# Patient Record
Sex: MALE | Race: WHITE | NOT HISPANIC OR LATINO | Employment: OTHER | URBAN - METROPOLITAN AREA
[De-identification: names, ages, dates, MRNs, and addresses within clinical notes are randomized per-mention and may not be internally consistent; named-entity substitution may affect disease eponyms.]

---

## 2019-02-11 NOTE — PATIENT INSTRUCTIONS
Preventive Health Recommendations:     See your health care provider every year to    Review health changes.     Discuss preventive care.      Review your medicines if your doctor has prescribed any.    Talk with your health care provider about whether you should have a test to screen for prostate cancer (PSA).    Every 3 years, have a diabetes test (fasting glucose). If you are at risk for diabetes, you should have this test more often.    Every 5 years, have a cholesterol test. Have this test more often if you are at risk for high cholesterol or heart disease.     Every 10 years, have a colonoscopy. Or, have a yearly FIT test (stool test). These exams will check for colon cancer.    Talk to with your health care provider about screening for Abdominal Aortic Aneurysm if you have a family history of AAA or have a history of smoking.  Shots:     Get a flu shot each year.     Get a tetanus shot every 10 years.     Talk to your doctor about your pneumonia vaccines. There are now two you should receive - Pneumovax (PPSV 23) and Prevnar (PCV 13).    Talk to your pharmacist about a shingles vaccine.     Talk to your doctor about the hepatitis B vaccine.  Nutrition:     Eat at least 5 servings of fruits and vegetables each day.     Eat whole-grain bread, whole-wheat pasta and brown rice instead of white grains and rice.     Get adequate Calcium and Vitamin D.   Lifestyle    Exercise for at least 150 minutes a week (30 minutes a day, 5 days a week). This will help you control your weight and prevent disease.     Limit alcohol to one drink per day.     No smoking.     Wear sunscreen to prevent skin cancer.     See your dentist every six months for an exam and cleaning.     See your eye doctor every 1 to 2 years to screen for conditions such as glaucoma, macular degeneration and cataracts.    Personalized Prevention Plan  You are due for the preventive services outlined below.  Your care team is available to assist you in  scheduling these services.  If you have already completed any of these items, please share that information with your care team to update in your medical record.    Health Maintenance Due   Topic Date Due     Talk to your care team about options to quit tobacco use.  1950     Zoster (Shingles) Vaccine (1 of 2) 11/25/2001     Discuss Advance Directive Planning  11/25/2006     FALL RISK ASSESSMENT  11/25/2016     AORTIC ANEURYSM SCREENING (SYSTEM ASSIGNED)  11/25/2016     Depression Assessment 2 - yearly  05/04/2017     Flu Vaccine (1) 09/01/2018     Preventive Health Recommendations:     See your health care provider every year to    Review health changes.     Discuss preventive care.      Review your medicines if your doctor has prescribed any.    Talk with your health care provider about whether you should have a test to screen for prostate cancer (PSA).    Every 3 years, have a diabetes test (fasting glucose). If you are at risk for diabetes, you should have this test more often.    Every 5 years, have a cholesterol test. Have this test more often if you are at risk for high cholesterol or heart disease.     Every 10 years, have a colonoscopy. Or, have a yearly FIT test (stool test). These exams will check for colon cancer.    Talk to with your health care provider about screening for Abdominal Aortic Aneurysm if you have a family history of AAA or have a history of smoking.  Shots:     Get a flu shot each year.     Get a tetanus shot every 10 years.     Talk to your doctor about your pneumonia vaccines. There are now two you should receive - Pneumovax (PPSV 23) and Prevnar (PCV 13).    Talk to your pharmacist about a shingles vaccine.     Talk to your doctor about the hepatitis B vaccine.  Nutrition:     Eat at least 5 servings of fruits and vegetables each day.     Eat whole-grain bread, whole-wheat pasta and brown rice instead of white grains and rice.     Get adequate Calcium and Vitamin D.    Lifestyle    Exercise for at least 150 minutes a week (30 minutes a day, 5 days a week). This will help you control your weight and prevent disease.     Limit alcohol to one drink per day.     No smoking.     Wear sunscreen to prevent skin cancer.     See your dentist every six months for an exam and cleaning.     See your eye doctor every 1 to 2 years to screen for conditions such as glaucoma, macular degeneration and cataracts.    Personalized Prevention Plan  You are due for the preventive services outlined below.  Your care team is available to assist you in scheduling these services.  If you have already completed any of these items, please share that information with your care team to update in your medical record.    Health Maintenance Due   Topic Date Due     Talk to your care team about options to quit tobacco use.  1950     Zoster (Shingles) Vaccine (1 of 2) 11/25/2001     Discuss Advance Directive Planning  11/25/2006     FALL RISK ASSESSMENT  11/25/2016     AORTIC ANEURYSM SCREENING (SYSTEM ASSIGNED)  11/25/2016     Depression Assessment 2 - yearly  05/04/2017     Flu Vaccine (1) 09/01/2018

## 2019-02-11 NOTE — PROGRESS NOTES
"SUBJECTIVE:   Uvaldo Ascencio is a 68 year old male who presents for Preventive Visit.    Are you in the first 12 months of your Medicare coverage?  No    Annual Wellness Visit     In general, how would you rate your overall health?  Good    Frequency of exercise:  2-3 days/week    Duration of exercise:  Greater than 60 minutes    Do you usually eat at least 4 servings of fruit and vegetables a day, include whole grains    & fiber and avoid regularly eating high fat or \"junk\" foods?  No    Taking medications regularly:  Yes    Medication side effects:  Not applicable    Ability to successfully perform activities of daily living:  No assistance needed    Home Safety:  No safety concerns identified    Hearing Impairment:  Difficulty following a conversation in a noisy restaurant or crowded room    In the past 6 months, have you been bothered by leaking of urine?  No    In general, how would you rate your overall mental or emotional health?  Excellent    PHQ-2 Total Score: 0    Additional concerns today:  Yes    Do you feel safe in your environment? Yes    Do you have a Health Care Directive? No: Advance care planning was reviewed with patient; patient declined at this time.    1) Concern about skin spot on the left side neck, onset for few years now, but patient states he noticed change of skin texture and color about a month now, redness.     Fall risk  Fallen 2 or more times in the past year?: No  Any fall with injury in the past year?: No  click delete button to remove this line now  Cognitive Screening   1) Repeat 3 items (Leader, Season, Table)    2) Clock draw: NORMAL  3) 3 item recall: Recalls 3 objects  Results: 3 items recalled: COGNITIVE IMPAIRMENT LESS LIKELY    Mini-CogTM Copyright JOY Velásquez. Licensed by the author for use in [facility]; reprinted with permission (jakub@.[facility]). All rights reserved.      Reviewed and updated as needed this visit by clinical staff  Tobacco  Allergies  Meds  " Med Hx  Surg Hx  Fam Hx  Soc Hx        Reviewed and updated as needed this visit by Provider        Social History     Tobacco Use     Smoking status: Former Smoker     Types: Cigars     Smokeless tobacco: Never Used   Substance Use Topics     Alcohol use: Yes     Comment: few a month       Alcohol Use 2/20/2019   If you drink alcohol do you typically have greater than 3 drinks per day OR greater than 7 drinks per week? Yes     Skin lesion  The patient states that he has a lesion on the left side of his shoulder. He states that the lesion itches and it changed color recently. He declines using any ointment for this lesion.     Heart palpitations   The patient states that his heart flutters at times. He states that the fluttering started a few years ago. He notes that if he shovels the driveway, and comes in to rest, he notices the fluttering. He declines shortness of breath, pain, lightheadedness or dizziness.     GERD  The patient states that from time to time, he won't take his Omeprazole. He states that overall he is doing well and mainly notices heartburn at night.     Current providers sharing in care for this patient include:   Patient Care Team:  Kathy Dickson MD as PCP - General (Family Practice)  Kathy Dickson MD as PCP - Assigned PCP    The following health maintenance items are reviewed in Epic and correct as of today:  Health Maintenance   Topic Date Due     TOBACCO CESSATION COUNSELING Q1 YR  1950     ZOSTER IMMUNIZATION (1 of 2) 11/25/2001     ADVANCE DIRECTIVE PLANNING Q5 YRS  11/25/2006     FALL RISK ASSESSMENT  11/25/2016     AORTIC ANEURYSM SCREENING (SYSTEM ASSIGNED)  11/25/2016     MEDICARE ANNUAL WELLNESS VISIT  05/04/2017     PHQ-2 Q1 YR  05/04/2017     INFLUENZA VACCINE (1) 09/01/2018     DTAP/TDAP/TD IMMUNIZATION (2 - Td) 04/05/2021     LIPID SCREEN Q5 YR MALE (SYSTEM ASSIGNED)  05/04/2021     COLON CANCER SCREEN (SYSTEM ASSIGNED)  07/15/2026     HEPATITIS C SCREENING   Completed     IPV IMMUNIZATION  Aged Out     MENINGITIS IMMUNIZATION  Aged Out     BP Readings from Last 3 Encounters:   02/20/19 138/80   07/15/16 114/86   05/04/16 140/80    Wt Readings from Last 3 Encounters:   02/20/19 73.9 kg (163 lb)   07/08/16 70.3 kg (155 lb)   05/04/16 75.1 kg (165 lb 8 oz)                  Patient Active Problem List   Diagnosis     GERD (gastroesophageal reflux disease)     CARDIOVASCULAR SCREENING; LDL GOAL LESS THAN 160     Knee pain     NSAID long-term use     Left inguinal hernia     Arthritis of knee     Tobacco use disorder     Past Surgical History:   Procedure Laterality Date     APPENDECTOMY       ARTHROPLASTY KNEE UNICOMPARTMENT  [facility]/27/12    [facility]     COLONOSCOPY WITH CO2 INSUFFLATION N/A 7/15/2016    Procedure: COLONOSCOPY WITH CO2 INSUFFLATION;  Surgeon: Kathy Dickson MD;  Location: [facility] OR     HERNIA REPAIR  2003    right sided       Social History     Tobacco Use     Smoking status: Former Smoker     Types: Cigars     Smokeless tobacco: Never Used   Substance Use Topics     Alcohol use: Yes     Comment: few a month     Family History   Problem Relation Age of Onset     Heart Disease Father      Breast Cancer Sister      Asthma No family hx of      C.A.D. No family hx of      Diabetes No family hx of      Hypertension No family hx of      Cerebrovascular Disease No family hx of      Cancer - colorectal No family hx of      Prostate Cancer No family hx of      Alcohol/Drug No family hx of      Allergies No family hx of      Alzheimer Disease No family hx of      Anesthesia Reaction No family hx of      Arthritis No family hx of      Circulatory No family hx of      Congenital Anomalies No family hx of      Blood Disease No family hx of      Cancer No family hx of      Cardiovascular No family hx of      Connective Tissue Disorder No family hx of          Current Outpatient Medications   Medication Sig Dispense Refill     Naproxen Sodium (ALEVE PO)         "Omeprazole Magnesium (PRILOSEC OTC PO)        Allergies   Allergen Reactions     No Known Drug Allergies      Recent Labs   Lab Test 05/04/16  1341 04/29/14  1442 04/07/11  1417   * 97 122   HDL 85 86 80   TRIG 79 89 94   ALT 21  --   --    CR 0.98  --  0.94   GFRESTIMATED 76  --  82   GFRESTBLACK >90   GFR Calc    --  >90   POTASSIUM 4.1  --  3.8      Review of Systems   Constitutional: Negative for chills and fever.   HENT: Negative for congestion, ear pain and sore throat.    Eyes: Negative for pain and visual disturbance.   Respiratory: Negative for cough and shortness of breath.    Cardiovascular: Positive for palpitations. Negative for chest pain and peripheral edema.   Gastrointestinal: Positive for heartburn. Negative for abdominal pain, constipation, diarrhea, hematochezia and nausea.   Genitourinary: Negative for discharge, dysuria, frequency, genital sores, hematuria, impotence and urgency.   Musculoskeletal: Positive for arthralgias and myalgias. Negative for joint swelling.   Skin: Positive for rash.   Neurological: Positive for dizziness and paresthesias. Negative for weakness and headaches.   Psychiatric/Behavioral: Negative for mood changes. The patient is nervous/anxious.      This document serves as a record of the services and decisions personally performed and made by Kathy Dickson MD. It was created on her behalf by Shahana Lin, a trained medical scribe. The creation of this document is based the provider's statements to the medical scribe.    Shahana Lin February 20, 2019 2:48 PM  OBJECTIVE:   /80   Pulse 84   Temp 98.1  F (36.7  C) (Oral)   Resp 16   Ht 1.685 m (5' 6.34\")   Wt 73.9 kg (163 lb)   BMI 26.04 kg/m   Estimated body mass index is 26.04 kg/m  as calculated from the following:    Height as of this encounter: 1.685 m (5' 6.34\").    Weight as of this encounter: 73.9 kg (163 lb).  Physical Exam  GENERAL: healthy, alert and no distress  EYES: " Eyes grossly normal to inspection, PERRL and conjunctivae and sclerae normal  HENT: ear canals and TM's normal, nose and mouth without ulcers or lesions  NECK: no adenopathy, no asymmetry, masses, or scars and thyroid normal to palpation  RESP: lungs clear to auscultation - no rales, rhonchi or wheezes  CV: regular rate and rhythm, normal S1 S2, no S3 or S4, no murmur, click or rub, no peripheral edema and peripheral pulses strong  ABDOMEN: soft, nontender, no hepatosplenomegaly, no masses and bowel sounds normal   (male): normal male genitalia without lesions or urethral discharge, no hernia  RECTAL: normal sphincter tone, no rectal masses, prostate normal size, smooth, nontender without nodules or masses  MS: no gross musculoskeletal defects noted, no edema  SKIN: no suspicious rashes, just above left clavicle is a quarter sized irregularly shaped lesion that has some superficial ulcerations centrally    NEURO: Normal strength and tone, mentation intact and speech normal  PSYCH: mentation appears normal, affect normal/bright    Diagnostic Test Results:  No results found for this or any previous visit (from the past 24 hour(s)).    ASSESSMENT / PLAN:   1. Routine general medical examination at a health care facility  Physical exam was administered today.   Patient is doing well.   See counseling messages below.     2. Neoplasm of uncertain behavior of skin  Patient reports having a lesion on the left side of his shoulder.  He reports that lesion itches and has changed color recently.   Exam showed just above left clavicle is a quarter sized irregularly shaped lesion that has some superficial ulcerations centrally.   Discussed plan with patient.  Patient agreed.  Dermatology referral has been given.   Patient will schedule appointment before leaving clinic today.   - DERMATOLOGY REFERRAL    3. Tobacco use disorder  Patient is a former smoker.   - TOBACCO CESSATION ORDER FOR         End of Life Planning:  Patient  "currently has an advanced directive: No.  I have verified the patient's ablity to prepare an advanced directive/make health care decisions.  Literature was provided to assist patient in preparing an advanced directive.    COUNSELING:  Special attention given to:       Regular exercise       Healthy diet/nutrition       Vision screening    BP Readings from Last 1 Encounters:   02/20/19 138/80     Estimated body mass index is 26.04 kg/m  as calculated from the following:    Height as of this encounter: 1.685 m (5' 6.34\").    Weight as of this encounter: 73.9 kg (163 lb).      Weight management plan: Discussed healthy diet and exercise guidelines     reports that he has quit smoking. His smoking use included cigars. he has never used smokeless tobacco.      Appropriate preventive services were discussed with this patient, including applicable screening as appropriate for cardiovascular disease, diabetes, osteopenia/osteoporosis, and glaucoma.  As appropriate for age/gender, discussed screening for colorectal cancer, prostate cancer, breast cancer, and cervical cancer. Checklist reviewing preventive services available has been given to the patient.    Reviewed patients plan of care and provided an AVS. The Basic Care Plan (routine screening as documented in Health Maintenance) for Uvaldo meets the Care Plan requirement. This Care Plan has been established and reviewed with the Patient.    Counseling Resources:  ATP IV Guidelines  Pooled Cohorts Equation Calculator  Breast Cancer Risk Calculator  FRAX Risk Assessment  ICSI Preventive Guidelines  Dietary Guidelines for Americans, 2010  USDA's MyPlate  ASA Prophylaxis  Lung CA Screening    The information in this document, created by the medical scribe for me, accurately reflects the services I personally performed and the decisions made by me. I have reviewed and approved this document for accuracy prior to leaving the patient care area.    DANE ROSARIO MD, " MD  [facility]

## 2019-02-20 ENCOUNTER — OFFICE VISIT (OUTPATIENT)
Dept: FAMILY MEDICINE | Facility: CLINIC | Age: 69
End: 2019-02-20
Payer: MEDICARE

## 2019-02-20 VITALS
HEART RATE: 84 BPM | BODY MASS INDEX: 26.2 KG/M2 | TEMPERATURE: 98.1 F | WEIGHT: 163 LBS | HEIGHT: 66 IN | RESPIRATION RATE: 16 BRPM | DIASTOLIC BLOOD PRESSURE: 80 MMHG | SYSTOLIC BLOOD PRESSURE: 138 MMHG

## 2019-02-20 DIAGNOSIS — F17.200 TOBACCO USE DISORDER: ICD-10-CM

## 2019-02-20 DIAGNOSIS — D48.5 NEOPLASM OF UNCERTAIN BEHAVIOR OF SKIN: ICD-10-CM

## 2019-02-20 DIAGNOSIS — Z00.00 ROUTINE GENERAL MEDICAL EXAMINATION AT A HEALTH CARE FACILITY: Primary | ICD-10-CM

## 2019-02-20 PROCEDURE — G0438 PPPS, INITIAL VISIT: HCPCS | Performed by: FAMILY MEDICINE

## 2019-02-20 ASSESSMENT — ENCOUNTER SYMPTOMS
PARESTHESIAS: 1
WEAKNESS: 0
PALPITATIONS: 1
FREQUENCY: 0
HEMATOCHEZIA: 0
FEVER: 0
CHILLS: 0
HEADACHES: 0
NAUSEA: 0
HEARTBURN: 1
DIARRHEA: 0
COUGH: 0
JOINT SWELLING: 0
SHORTNESS OF BREATH: 0
EYE PAIN: 0
HEMATURIA: 0
SORE THROAT: 0
ABDOMINAL PAIN: 0
DIZZINESS: 1
CONSTIPATION: 0
MYALGIAS: 1
ARTHRALGIAS: 1
DYSURIA: 0
NERVOUS/ANXIOUS: 1

## 2019-02-20 ASSESSMENT — MIFFLIN-ST. JEOR: SCORE: 1462.49

## 2019-02-20 ASSESSMENT — PAIN SCALES - GENERAL: PAINLEVEL: NO PAIN (0)

## 2019-02-20 ASSESSMENT — ACTIVITIES OF DAILY LIVING (ADL): CURRENT_FUNCTION: NO ASSISTANCE NEEDED

## 2019-02-21 ENCOUNTER — OFFICE VISIT (OUTPATIENT)
Dept: DERMATOLOGY | Facility: CLINIC | Age: 69
End: 2019-02-21
Attending: FAMILY MEDICINE
Payer: MEDICARE

## 2019-02-21 DIAGNOSIS — L82.1 SEBORRHEIC KERATOSIS: ICD-10-CM

## 2019-02-21 DIAGNOSIS — L57.8 SUN-DAMAGED SKIN: ICD-10-CM

## 2019-02-21 DIAGNOSIS — L81.4 SOLAR LENTIGO: ICD-10-CM

## 2019-02-21 DIAGNOSIS — D22.9 MULTIPLE BENIGN NEVI: ICD-10-CM

## 2019-02-21 DIAGNOSIS — L57.0 ACTINIC KERATOSES: ICD-10-CM

## 2019-02-21 DIAGNOSIS — D48.5 NEOPLASM OF UNCERTAIN BEHAVIOR OF SKIN: Primary | ICD-10-CM

## 2019-02-21 DIAGNOSIS — D18.01 CHERRY ANGIOMA: ICD-10-CM

## 2019-02-21 DIAGNOSIS — R23.8 VENOUS LAKE OF LIP: ICD-10-CM

## 2019-02-21 PROCEDURE — 11103 TANGNTL BX SKIN EA SEP/ADDL: CPT | Performed by: DERMATOLOGY

## 2019-02-21 PROCEDURE — 99203 OFFICE O/P NEW LOW 30 MIN: CPT | Mod: 25 | Performed by: DERMATOLOGY

## 2019-02-21 PROCEDURE — 88305 TISSUE EXAM BY PATHOLOGIST: CPT | Mod: TC | Performed by: DERMATOLOGY

## 2019-02-21 PROCEDURE — 11102 TANGNTL BX SKIN SINGLE LES: CPT | Performed by: DERMATOLOGY

## 2019-02-21 RX ORDER — FLUOROURACIL 50 MG/G
CREAM TOPICAL
Qty: 40 G | Refills: 1 | Status: SHIPPED | OUTPATIENT
Start: 2019-02-21 | End: 2020-02-18

## 2019-02-21 ASSESSMENT — PAIN SCALES - GENERAL: PAINLEVEL: NO PAIN (0)

## 2019-02-21 NOTE — PROGRESS NOTES
[facility] Dermatology Note      Dermatology Problem List:  1. NUB, A) left supraclavicular, s/p biopsy 2/21/19  2. NUB, B) left shin, s/p biopsy 2/21/2019  3. Actinic keratoses, diffuse  - current tx: efudex 5% cream BID for 3-4 weeks    Encounter Date: Feb 21, 2019    CC:  Chief Complaint   Patient presents with     Skin Check     left neck         History of Present Illness:  Mr. Uvaldo Ascencio is a 68 year old male who presents as a referral from Dr. Dickson for a lesion of concern on his left neck. It has been there about a year. He denies any bleeding or pain at the site. When asked about a lesion on his left shin, he states he had not noticed a spot there. No symptoms at this area either. No personal or family history of skin cancer. Patient had frequent sun exposure with his job for [employer] - he is now retired but still spends a lot of time outdoors. No other concerns addressed today.      Past Medical History:   Patient Active Problem List   Diagnosis     GERD (gastroesophageal reflux disease)     CARDIOVASCULAR SCREENING; LDL GOAL LESS THAN 160     Knee pain     NSAID long-term use     Left inguinal hernia     Arthritis of knee     Tobacco use disorder     No past medical history on file.  Past Surgical History:   Procedure Laterality Date     APPENDECTOMY       ARTHROPLASTY KNEE UNICOMPARTMENT  01/27/12    [facility]     COLONOSCOPY WITH CO2 INSUFFLATION N/A 7/15/2016    Procedure: COLONOSCOPY WITH CO2 INSUFFLATION;  Surgeon: Kahty Dickson MD;  Location: [facility] OR     HERNIA REPAIR  2003    right sided       Social History:  Patient reports that he has quit smoking. His smoking use included cigars. he has never used smokeless tobacco. He reports that he drinks alcohol. He reports that he does not use drugs. Patient is retired from [employer]. Patient is an avid connolly      Family History:  Family History   Problem Relation Age of Onset     Heart Disease Father      Breast Cancer  Sister      Asthma No family hx of      C.A.D. No family hx of      Diabetes No family hx of      Hypertension No family hx of      Cerebrovascular Disease No family hx of      Cancer - colorectal No family hx of      Prostate Cancer No family hx of      Alcohol/Drug No family hx of      Allergies No family hx of      Alzheimer Disease No family hx of      Anesthesia Reaction No family hx of      Arthritis No family hx of      Circulatory No family hx of      Congenital Anomalies No family hx of      Blood Disease No family hx of      Cancer No family hx of      Cardiovascular No family hx of      Connective Tissue Disorder No family hx of        Medications:  Current Outpatient Medications   Medication Sig Dispense Refill     Naproxen Sodium (ALEVE PO)        Omeprazole Magnesium (PRILOSEC OTC PO)          Allergies   Allergen Reactions     No Known Drug Allergies        Review of Systems:  -Constitutional: Patient is otherwise feeling well, in usual state of health.   -Skin: As above in HPI. No additional skin concerns.    Physical exam:  Vitals: There were no vitals taken for this visit.  GEN: This is a well developed, well-nourished male in no acute distress, in a pleasant mood.    SKIN: Total skin excluding the undergarment areas was performed. The exam included the head/face, neck, both arms, chest, back, abdomen, both legs, digits and/or nails and buttocks.   - Carlin Type I  - Numerous brown macules on sun exposed areas.  - White macules scattered on mid to lower back   - Patient tan on exam.  - venous lake on the left lower lip  - Left supraclavicular, 2 x 1.5 cm pink shiny plaque with erosion overlying it, blue grey ovoid nest appearnce, fine blood vessels at the periphery.  - Diffuse actinic keratoses on the vertex frontal, scalp, forehead, nose, and preauricular cheeks  -There are dome shaped bright red papules on the trunk.   - Multiple regular brown pigmented macules and papules are identified on  the trunk and extremtiies.   - There are waxy stuck on tan to brown papules on the trunk.  - Left anterior shin, 9 mm pink macule with blue grey ovoid nest appearance to it  - No other lesions of concern on areas examined.                 Impression/Plan:  1. Sun damaged skin with solar lentigines    Recommend sunscreens SPF #30 or greater, protective clothing and avoidance of tanning beds.    2. Benign findings including: seborrheic keratoses, cherry angioma, venous lake    No further intervention required. Patient to report changes.     Patient reassured of the benign nature of these lesions.    3. Multiple clinically benign nevi    No further intervention required. Patient to report changes.     Patient reassured of the benign nature of these lesions.    4. Actinic keratosis. Discussed precancerous nature of these lesions. Recommended treatment to prevent progression to a squamous cell carcinoma. Due to the diffuse nature of the actinic keratoses, recommend field treatment with efudex 5% cream. Briefly discussed PDT as a treatment. Patient is agreeable to treat with efudex cream as this likely has higher clearance rate.     Prescribed efudex cream to be used on the scalp, forehead, lateral cheeks, and nose for 3-4 weeks twice a day. Discussed side effects of the medication with the patient, handout provided.     5. NUB, A) left supraclavicular. Ddx: BCC vs SCC    Shave biopsy:  After discussion of benefits and risks including but not limited to bleeding/bruising, pain/swelling, infection, scar, incomplete removal, nerve damage/numbness, recurrence, and non-diagnostic biopsy, written consent, verbal consent and photographs were obtained. Time-out was performed. The area was cleaned with isopropyl alcohol. 0.5ml of 1% lidocaine with 1:100,000 epinephrine was injected to obtain adequate anesthesia. A shave biopsy was performed. Hemostasis was achieved with aluminium chloride. Vaseline and a sterile dressing were  applied. The patient tolerated the procedure and no complications were noted. The patient was provided with verbal and written post care instructions.    6. NUB, B) left shin. Ddx: BCC vs scar    Shave biopsy:  After discussion of benefits and risks including but not limited to bleeding/bruising, pain/swelling, infection, scar, incomplete removal, nerve damage/numbness, recurrence, and non-diagnostic biopsy, written consent, verbal consent and photographs were obtained. Time-out was performed. The area was cleaned with isopropyl alcohol. 0.5ml of 1% lidocaine with 1:100,000 epinephrine was injected to obtain adequate anesthesia. A shave biopsy was performed. Hemostasis was achieved with aluminium chloride. Vaseline and a sterile dressing were applied. The patient tolerated the procedure and no complications were noted. The patient was provided with verbal and written post care instructions.    CC Kathy Dickson MD on close of this encounter.  Follow-up in 2 months to recheck AKs after efudex treatment, earlier for new or changing lesions.       Staff Involved:  Scribe/Staff    Scribe Disclosure  I, Rita Powell, am serving as a scribe to document services personally performed by Dr. Tess Chang MD, based on data collection and the provider's statements to me.     Provider Disclosure:   The documentation recorded by the scribe accurately reflects the services I personally performed and the decisions made by me.    Tess Chang MD    Department of Dermatology  [facility]: Phone: [phone], Fax:[phone]  [facility]: Phone: [phone], Fax: [phone]

## 2019-02-21 NOTE — NURSING NOTE
Uvaldo Ascencio's goals for this visit include:   Chief Complaint   Patient presents with     Skin Check     left neck       He requests these members of his care team be copied on today's visit information: NO    PCP: Kathy Dickson    Referring Provider:  Kathy Dickson MD  [address] [place] [address]    There were no vitals taken for this visit.    Do you need any medication refills at today's visit? NO    Tomasa Larry [facility]

## 2019-02-21 NOTE — LETTER
2/21/2019         RE: Uvaldo Ascencio  [address]  [place] [address]        Dear Colleague,    Thank you for referring your patient, Uvaldo Ascencio, to the [facility]. Please see a copy of my visit note below.    [facility] Dermatology Note      Dermatology Problem List:  1. NUB, A) left supraclavicular, s/p biopsy 2/21/19  2. NUB, B) left shin, s/p biopsy 2/21/2019  3. Actinic keratoses, diffuse  - current tx: efudex 5% cream BID for 3-4 weeks    Encounter Date: Feb 21, 2019    CC:  Chief Complaint   Patient presents with     Skin Check     left neck         History of Present Illness:  Mr. Uvaldo Ascencio is a 68 year old male who presents as a referral from Dr. Dickson for a lesion of concern on his left neck. It has been there about a year. He denies any bleeding or pain at the site. When asked about a lesion on his left shin, he states he had not noticed a spot there. No symptoms at this area either. No personal or family history of skin cancer. Patient had frequent sun exposure with his job for [employer] - he is now retired but still spends a lot of time outdoors. No other concerns addressed today.      Past Medical History:   Patient Active Problem List   Diagnosis     GERD (gastroesophageal reflux disease)     CARDIOVASCULAR SCREENING; LDL GOAL LESS THAN 160     Knee pain     NSAID long-term use     Left inguinal hernia     Arthritis of knee     Tobacco use disorder     No past medical history on file.  Past Surgical History:   Procedure Laterality Date     APPENDECTOMY       ARTHROPLASTY KNEE UNICOMPARTMENT  01/27/12    [facility]     COLONOSCOPY WITH CO2 INSUFFLATION N/A 7/15/2016    Procedure: COLONOSCOPY WITH CO2 INSUFFLATION;  Surgeon: Kathy Dickson MD;  Location: [facility] OR     HERNIA REPAIR  2003    right sided       Social History:  Patient reports that he has quit smoking. His smoking use included cigars. he has never used smokeless tobacco. He  reports that he drinks alcohol. He reports that he does not use drugs. Patient is retired from [employer]. Patient is an avid connolly      Family History:  Family History   Problem Relation Age of Onset     Heart Disease Father      Breast Cancer Sister      Asthma No family hx of      C.A.D. No family hx of      Diabetes No family hx of      Hypertension No family hx of      Cerebrovascular Disease No family hx of      Cancer - colorectal No family hx of      Prostate Cancer No family hx of      Alcohol/Drug No family hx of      Allergies No family hx of      Alzheimer Disease No family hx of      Anesthesia Reaction No family hx of      Arthritis No family hx of      Circulatory No family hx of      Congenital Anomalies No family hx of      Blood Disease No family hx of      Cancer No family hx of      Cardiovascular No family hx of      Connective Tissue Disorder No family hx of        Medications:  Current Outpatient Medications   Medication Sig Dispense Refill     Naproxen Sodium (ALEVE PO)        Omeprazole Magnesium (PRILOSEC OTC PO)          Allergies   Allergen Reactions     No Known Drug Allergies        Review of Systems:  -Constitutional: Patient is otherwise feeling well, in usual state of health.   -Skin: As above in HPI. No additional skin concerns.    Physical exam:  Vitals: There were no vitals taken for this visit.  GEN: This is a well developed, well-nourished male in no acute distress, in a pleasant mood.    SKIN: Total skin excluding the undergarment areas was performed. The exam included the head/face, neck, both arms, chest, back, abdomen, both legs, digits and/or nails and buttocks.   - Carlin Type I  - Numerous brown macules on sun exposed areas.  - White macules scattered on mid to lower back   - Patient tan on exam.  - venous lake on the left lower lip  - Left supraclavicular, 2 x 1.5 cm pink shiny plaque with erosion overlying it, blue grey ovoid nest appearnce, fine blood vessels at the  periphery.  - Diffuse actinic keratoses on the vertex frontal, scalp, forehead, nose, and preauricular cheeks  -There are dome shaped bright red papules on the trunk.   - Multiple regular brown pigmented macules and papules are identified on the trunk and extremtiies.   - There are waxy stuck on tan to brown papules on the trunk.  - Left anterior shin, 9 mm pink macule with blue grey ovoid nest appearance to it  - No other lesions of concern on areas examined.                 Impression/Plan:  1. Sun damaged skin with solar lentigines    Recommend sunscreens SPF #30 or greater, protective clothing and avoidance of tanning beds.    2. Benign findings including: seborrheic keratoses, cherry angioma, venous lake    No further intervention required. Patient to report changes.     Patient reassured of the benign nature of these lesions.    3. Multiple clinically benign nevi    No further intervention required. Patient to report changes.     Patient reassured of the benign nature of these lesions.    4. Actinic keratosis. Discussed precancerous nature of these lesions. Recommended treatment to prevent progression to a squamous cell carcinoma. Due to the diffuse nature of the actinic keratoses, recommend field treatment with efudex 5% cream. Briefly discussed PDT as a treatment. Patient is agreeable to treat with efudex cream as this likely has higher clearance rate.     Prescribed efudex cream to be used on the scalp, forehead, lateral cheeks, and nose for 3-4 weeks twice a day. Discussed side effects of the medication with the patient, handout provided.     5. NUB, A) left supraclavicular. Ddx: BCC vs SCC    Shave biopsy:  After discussion of benefits and risks including but not limited to bleeding/bruising, pain/swelling, infection, scar, incomplete removal, nerve damage/numbness, recurrence, and non-diagnostic biopsy, written consent, verbal consent and photographs were obtained. Time-out was performed. The area was  cleaned with isopropyl alcohol. 0.5ml of 1% lidocaine with 1:100,000 epinephrine was injected to obtain adequate anesthesia. A shave biopsy was performed. Hemostasis was achieved with aluminium chloride. Vaseline and a sterile dressing were applied. The patient tolerated the procedure and no complications were noted. The patient was provided with verbal and written post care instructions.    6. NUB, B) left shin. Ddx: BCC vs scar    Shave biopsy:  After discussion of benefits and risks including but not limited to bleeding/bruising, pain/swelling, infection, scar, incomplete removal, nerve damage/numbness, recurrence, and non-diagnostic biopsy, written consent, verbal consent and photographs were obtained. Time-out was performed. The area was cleaned with isopropyl alcohol. 0.5ml of 1% lidocaine with 1:100,000 epinephrine was injected to obtain adequate anesthesia. A shave biopsy was performed. Hemostasis was achieved with aluminium chloride. Vaseline and a sterile dressing were applied. The patient tolerated the procedure and no complications were noted. The patient was provided with verbal and written post care instructions.    CC Kathy Dickson MD on close of this encounter.  Follow-up in 2 months to recheck AKs after efudex treatment, earlier for new or changing lesions.       Staff Involved:  Scribe/Staff    Scribe Disclosure  I, Rita Powell, am serving as a scribe to document services personally performed by Dr. Tess Chang MD, based on data collection and the provider's statements to me.     Provider Disclosure:   The documentation recorded by the scribe accurately reflects the services I personally performed and the decisions made by me.    Tess Chang MD    Department of Dermatology  [facility]: Phone: [phone], Fax:[phone]  [facility]: Phone: [phone],  Fax: [phone]              Again, thank you for allowing me to participate in the care of your patient.        Sincerely,        Tess Chang MD

## 2019-02-21 NOTE — PATIENT INSTRUCTIONS
Efudex Treatment    Today, you are being prescribed Fluorouracil (Efudex) a topical cream used for the treatment of Actinic Keratosis (AK's).  The medication is working to eliminate the unhealthy cells. Even though this treatment may be unattractive and somewhat uncomfortable.    Your treatment will last twice daily for 3-4 weeks, apply to the scalp, forehead, lateral cheeks, and nose.  You may experience some mild discomfort while being treated.    You will want to stop any other creams such as glycolic acid products, retin A, Tazorac, etc. to the area. You may use bland makeup/cover-up as long as it doesn't sting or cause you discomfort.    Apply the cream at night as your physician recommends. Use a cotton-tipped applicator, or use gloves if applying it with your fingertips. If applied with unprotected fingertips, it is important to wash your hands well after you apply this medicine.     Keep this medication away from pets.    We recommend avoiding excessive sun exposure to the treated area    You may use moisturizing creams over bothersome areas such as Vanicream or Cetaphil cream if the reaction becomes too bothersome. Please, call the clinic if this occurs.   Potential Side Effects    Your treated areas may be unsightly during therapy.  This will improve slowly following the discontinuation of therapy.     During the first week of application, mild inflammation may occur.     During the following weeks, redness, and swelling may occur with some crusting and burning.     Lesions resolve as the skin exfoliates.     Over 1 to 2 weeks, new skin grows into the treatment area.    Keep this medication away from pets  Specific side effects that usually do not require medical attention (report to your doctor or health care professional if they continue or are bothersome) include: Red or dark-colored skin     Mild erosion (loss of upper layer of skin)     Mild eye irritation including burning, itching, sensitivity,  stinging, or watering     Increased sensitivity of the skin to sun and ultraviolet light     Pain and burning of the affected area     Dryness, scaling or swelling of the affected area     Skin rash, itching of the affected area     Tenderness     If you have severe pain, please, call the clinic immediately and indicate that you have pain. Ask for a call from the RN.   Who should I call with questions?     [facility]: [phone]     [facility]: [phone]     For urgent needs outside of business hours call the [facility] at [phone]  and ask for the dermatology resident on call            Wound Care After a Biopsy    What is a skin biopsy?  A skin biopsy allows the doctor to examine a very small piece of tissue under the microscope to determine the diagnosis and the best treatment for the skin condition. A local anesthetic (numbing medicine)  is injected with a very small needle into the skin area to be tested. A small piece of skin is taken from the area. Sometimes a suture (stitch) is used.     What are the risks of a skin biopsy?  I will experience scar, bleeding, swelling, pain, crusting and redness. I may experience incomplete removal or recurrence. Risks of this procedure are excessive bleeding, bruising, infection, nerve damage, numbness, thick (hypertrophic or keloidal) scar and non-diagnostic biopsy.    How should I care for my wound for the first 24 hours?    Keep the wound dry and covered for 24 hours    If it bleeds, hold direct pressure on the area for 15 minutes. If bleeding does not stop then go to the emergency room    Avoid strenuous exercise the first 1-2 days or as your doctor instructs you    How should I care for the wound after 24 hours?    After 24 hours, remove the bandage    You may bathe or shower as normal    If you had a scalp biopsy, you can shampoo as usual and can use shower water to clean the biopsy site  daily    Clean the wound twice a day with gentle soap and water    Do not scrub, be gentle    Apply white petroleum/Vaseline after cleaning the wound with a cotton swab or a clean finger, and keep the site covered with a Bandaid /bandage. Bandages are not necessary with a scalp biopsy    If you are unable to cover the site with a Bandaid /bandage, re-apply ointment 2-3 times a day to keep the site moist. Moisture will help with healing    Avoid strenuous activity for first 1-2 days    Avoid lakes, rivers, pools, and oceans until the stitches are removed or the site is healed    How do I clean my wound?    Wash hands thoroughly with soap or use hand  before all wound care    Clean the wound with gentle soap and water    Apply white petroleum/Vaseline  to wound after it is clean    Replace the Bandaid /bandage to keep the wound covered for the first few days or as instructed by your doctor    If you had a scalp biopsy, warm shower water to the area on a daily basis should suffice    What should I use to clean my wound?     Cotton-tipped applicators (Qtips )    White petroleum jelly (Vaseline ). Use a clean new container and use Q-tips to apply.    Bandaids   as needed    Gentle soap     How should I care for my wound long term?    Do not get your wound dirty    Keep up with wound care for one week or until the area is healed.    A small scab will form and fall off by itself when the area is completely healed. The area will be red and will become pink in color as it heals. Sun protection is very important for how your scar will turn out. Sunscreen with an SPF 30 or greater is recommended once the area is healed.    You should have some soreness but it should be mild and slowly go away over several days. Talk to your doctor about using tylenol for pain,    When should I call my doctor?  If you have increased:     Pain or swelling    Pus or drainage (clear or slightly yellow drainage is ok)    Temperature over  100F    Spreading redness or warmth around wound    When will I hear about my results?  The biopsy results can take 2-3 weeks to come back. The clinic will call you with the results, send you a mycScrybet message, or have you schedule a follow-up clinic or phone time to discuss the results. Contact our clinics if you do not hear from us in 3 weeks.     Who should I call with questions?    [facility]: [phone]     [facility]: [phone]    For urgent needs outside of business hours call the [facility] at [phone] and ask for the dermatology resident on call

## 2019-02-25 LAB — COPATH REPORT: NORMAL

## 2019-02-26 ENCOUNTER — TELEPHONE (OUTPATIENT)
Dept: DERMATOLOGY | Facility: CLINIC | Age: 69
End: 2019-02-26

## 2019-02-26 NOTE — TELEPHONE ENCOUNTER
Notes recorded by Tess Chang MD on 2/26/2019 at 12:07 PM CST  Ok to use efudex instead of imiquimod as patient already has this at home.     Tess Chang MD    Department of Dermatology  [facility]: Phone: [phone], Fax:[phone]  [facility]: Phone: [phone], Fax: [phone]      ------    Notes recorded by Pilar Suarez LPN on 2/26/2019 at 12:01 PM CST  Pt called back at this time and made aware of results. Pt verbalized understanding. Pt currently using effudex on scalp and was wondering if he could use it on leg also. LPN asked Dr. Chang if this would be okay, she states yes that would be okay to use on the leg as well. Made pt aware and he had no concerns. Pt scheduled for effudex follow up and then 6 month skin check.     Pilar Suarez LPN    ------    Notes recorded by Nataliia Dockery, RN on 2/26/2019 at 9:58 AM CST  I left a message for patient to call [facility].  Nataliia Dockery RN    ------    Notes recorded by Tess Chang MD on 2/26/2019 at 8:17 AM CST  Please call patient and let him know results:    A. Harmless skin growth called BLK. Nothing to do.  B. Superficial BCC - I recommend imiquimod 5% cream applied daily to the area for 6 weeks. Please discuss treatment expectations (redness, sick irritation, rare but possible flu like symptoms, small chance of recurrence of skin cancer after treatment so we will need to monitor site after treatment for changes at future skin checks) with patient. I would like to see him back in 3 months to ensure the skin cancer is gone after treatment. If patient agreeable to plan, please let me or RN know so prescription can be sent.    Tess Chang MD    Department of Dermatology  [facility]:  Phone: [phone], Fax:[phone]  [facility]: Phone: [phone], Fax: [phone]      Patient Name: ESTEFANI BELLAMY   MR#: [id number]   Specimen #: [id number]   Collected: 2/21/2019   Received: 2/22/2019   Reported: 2/25/2019 23:14   Ordering Phy(s): LESLIE SAEED     For improved result formatting, select 'View Enhanced Report Format' under    Linked Documents section.     SPECIMEN(S):   A: Shave, left supraclavicular   B: Shave, left shin     FINAL DIAGNOSIS:   A. Shave, left supraclavicular:   - Benign lichenoid keratosis - see description     B. Shave, left shin:   - Basal cell carcinoma, superficial type - (see comment and description)

## 2019-04-24 ENCOUNTER — OFFICE VISIT (OUTPATIENT)
Dept: DERMATOLOGY | Facility: CLINIC | Age: 69
End: 2019-04-24
Payer: MEDICARE

## 2019-04-24 DIAGNOSIS — C44.91 SUPERFICIAL BASAL CELL CARCINOMA: Primary | ICD-10-CM

## 2019-04-24 PROCEDURE — 99212 OFFICE O/P EST SF 10 MIN: CPT | Performed by: DERMATOLOGY

## 2019-04-24 ASSESSMENT — PAIN SCALES - GENERAL: PAINLEVEL: NO PAIN (0)

## 2019-04-24 NOTE — NURSING NOTE
Uvaldo Ascencio's goals for this visit include:   Chief Complaint   Patient presents with     Lesion     left leg s/p efudex       He requests these members of his care team be copied on today's visit information: NO    PCP: Kathy Dickson    Referring Provider:  No referring provider defined for this encounter.    There were no vitals taken for this visit.    Do you need any medication refills at today's visit? NO    Tomasa Larry CMA

## 2019-04-24 NOTE — PROGRESS NOTES
[facility] Dermatology Note      Dermatology Problem List:  1. BLK, left supraclavicular chest, s/p biopsy 2/21/19  2. Superficial BCC, left shin, s/p biopsy 2/21/2019, efudex cream x6 weeks. *Recheck at next visit  3. Actinic keratoses, diffuse  - current tx: efudex 5% cream BID for 3-4 weeks    Encounter Date: Apr 24, 2019    CC:  Chief Complaint   Patient presents with     Lesion     left leg s/p efudex         History of Present Illness:  Mr. Uvaldo Ascencio is a 68 year old male who presents as a follow up for treatment with efudex cream. He has been treating his superficial BCC with efudex 5% cream for 2 weeks. He notes that the area did not get very red with treatment. It was never tender. He is unsure if it changed at all with the cream.     No other concerns addressed today.      Past Medical History:   Patient Active Problem List   Diagnosis     GERD (gastroesophageal reflux disease)     CARDIOVASCULAR SCREENING; LDL GOAL LESS THAN 160     Knee pain     NSAID long-term use     Left inguinal hernia     Arthritis of knee     Tobacco use disorder     No past medical history on file.  Past Surgical History:   Procedure Laterality Date     APPENDECTOMY       ARTHROPLASTY KNEE UNICOMPARTMENT  01/27/12    [facility]     COLONOSCOPY WITH CO2 INSUFFLATION N/A 7/15/2016    Procedure: COLONOSCOPY WITH CO2 INSUFFLATION;  Surgeon: Kathy Dickson MD;  Location: [facility] OR     HERNIA REPAIR  2003    right sided       Social History:  Patient reports that he has quit smoking. His smoking use included cigars. He has never used smokeless tobacco. He reports that he drinks alcohol. He reports that he does not use drugs. Patient is retired from [employer]. Patient is an avid connolly      Family History:  No family history of skin cancer.    Medications:  Current Outpatient Medications   Medication Sig Dispense Refill     fluorouracil (EFUDEX) 5 % external cream Apply twice daily as directed for 3-4 weeks  as tolerated. 40 g 1     Naproxen Sodium (ALEVE PO)        Omeprazole Magnesium (PRILOSEC OTC PO)          Allergies   Allergen Reactions     No Known Drug Allergies        Review of Systems:  -Constitutional: Patient is otherwise feeling well, in usual state of health.   -Skin: As above in HPI. No additional skin concerns.    Physical exam:  Vitals: There were no vitals taken for this visit.  GEN: This is a well developed, well-nourished male in no acute distress, in a pleasant mood.    SKIN: Focused exam of the face and left lower leg:  - Carlin Type I  - left medial anterior leg: bright pink macule, 2 mm in size adjacent to this area of hyperkeratotic scale where biopsy was completed. no arborizing or sharply in focus vessels or dermoscopy  - No other lesions of concern on areas examined.         Impression/Plan:    1. Superficial BCC, s/p efudex cream x 2 weeks.     Recommended patient continue the efudex cream another 4 weeks BID for a total of 6 weeks. Will recheck at next skin check to ensure that bright pink color has resolved.       Follow-up 5 months for skin exam, sooner for lesions of concern.       Staff Involved:  Scribe/Staff    Scribe Disclosure  I, Rita Powell, am serving as a scribe to document services personally performed by Dr. Tess Chang MD, based on data collection and the provider's statements to me.     Provider Disclosure:   The documentation recorded by the scribe accurately reflects the services I personally performed and the decisions made by me.    Tess Chang MD    Department of Dermatology  [facility]: Phone: [phone], Fax:[phone]  [facility]: Phone: [phone], Fax: [phone]

## 2019-04-24 NOTE — LETTER
4/24/2019         RE: Uvaldo Ascencio  [address]  [place] [address]        Dear Colleague,    Thank you for referring your patient, Uvaldo Ascencio, to the [facility]. Please see a copy of my visit note below.    [facility] Dermatology Note      Dermatology Problem List:  1. BLK, left supraclavicular chest, s/p biopsy 2/21/19  2. Superficial BCC, left shin, s/p biopsy 2/21/2019, efudex cream x6 weeks. *Recheck at next visit  3. Actinic keratoses, diffuse  - current tx: efudex 5% cream BID for 3-4 weeks    Encounter Date: Apr 24, 2019    CC:  Chief Complaint   Patient presents with     Lesion     left leg s/p efudex         History of Present Illness:  Mr. Uvaldo Ascencio is a 68 year old male who presents as a follow up for treatment with efudex cream. He has been treating his superficial BCC with efudex 5% cream for 2 weeks. He notes that the area did not get very red with treatment. It was never tender. He is unsure if it changed at all with the cream.     No other concerns addressed today.      Past Medical History:   Patient Active Problem List   Diagnosis     GERD (gastroesophageal reflux disease)     CARDIOVASCULAR SCREENING; LDL GOAL LESS THAN 160     Knee pain     NSAID long-term use     Left inguinal hernia     Arthritis of knee     Tobacco use disorder     No past medical history on file.  Past Surgical History:   Procedure Laterality Date     APPENDECTOMY       ARTHROPLASTY KNEE UNICOMPARTMENT  01/27/12    [facility]     COLONOSCOPY WITH CO2 INSUFFLATION N/A 7/15/2016    Procedure: COLONOSCOPY WITH CO2 INSUFFLATION;  Surgeon: Kathy Dickson MD;  Location: [facility] OR     HERNIA REPAIR  2003    right sided       Social History:  Patient reports that he has quit smoking. His smoking use included cigars. He has never used smokeless tobacco. He reports that he drinks alcohol. He reports that he does not use drugs. Patient is retired from [employer]. Patient is  an [employer]      Family History:  No family history of skin cancer.    Medications:  Current Outpatient Medications   Medication Sig Dispense Refill     fluorouracil (EFUDEX) 5 % external cream Apply twice daily as directed for 3-4 weeks as tolerated. 40 g 1     Naproxen Sodium (ALEVE PO)        Omeprazole Magnesium (PRILOSEC OTC PO)          Allergies   Allergen Reactions     No Known Drug Allergies        Review of Systems:  -Constitutional: Patient is otherwise feeling well, in usual state of health.   -Skin: As above in HPI. No additional skin concerns.    Physical exam:  Vitals: There were no vitals taken for this visit.  GEN: This is a well developed, well-nourished male in no acute distress, in a pleasant mood.    SKIN: Focused exam of the face and left lower leg:  - Carlin Type I  - left medial anterior leg: bright pink macule, 2 mm in size adjacent to this area of hyperkeratotic scale where biopsy was completed. no arborizing or sharply in focus vessels or dermoscopy  - No other lesions of concern on areas examined.         Impression/Plan:    1. Superficial BCC, s/p efudex cream x 2 weeks.     Recommended patient continue the efudex cream another 4 weeks BID for a total of 6 weeks. Will recheck at next skin check to ensure that bright pink color has resolved.       Follow-up 5 months for skin exam, sooner for lesions of concern.       Staff Involved:  Scribe/Staff    Scribe Disclosure  I, Rita Powell, am serving as a scribe to document services personally performed by Dr. Tess Chang MD, based on data collection and the provider's statements to me.     Provider Disclosure:   The documentation recorded by the scribe accurately reflects the services I personally performed and the decisions made by me.    Tess Chang MD    Department of Dermatology  [facility]: Phone: [phone], Fax:[phone]  [place]  [facility]: Phone: [phone], Fax: [phone]                Again, thank you for allowing me to participate in the care of your patient.        Sincerely,        Tess Chang MD

## 2019-08-27 ENCOUNTER — OFFICE VISIT (OUTPATIENT)
Dept: DERMATOLOGY | Facility: CLINIC | Age: 69
End: 2019-08-27
Payer: MEDICARE

## 2019-08-27 DIAGNOSIS — L81.4 SOLAR LENTIGO: ICD-10-CM

## 2019-08-27 DIAGNOSIS — Z85.828 HISTORY OF NONMELANOMA SKIN CANCER: Primary | ICD-10-CM

## 2019-08-27 DIAGNOSIS — L82.1 SEBORRHEIC KERATOSIS: ICD-10-CM

## 2019-08-27 DIAGNOSIS — L57.8 SUN-DAMAGED SKIN: ICD-10-CM

## 2019-08-27 DIAGNOSIS — D18.01 CHERRY ANGIOMA: ICD-10-CM

## 2019-08-27 DIAGNOSIS — D22.9 MULTIPLE BENIGN NEVI: ICD-10-CM

## 2019-08-27 DIAGNOSIS — L57.0 ACTINIC KERATOSIS: ICD-10-CM

## 2019-08-27 PROCEDURE — 17003 DESTRUCT PREMALG LES 2-14: CPT | Performed by: DERMATOLOGY

## 2019-08-27 PROCEDURE — 17000 DESTRUCT PREMALG LESION: CPT | Performed by: DERMATOLOGY

## 2019-08-27 PROCEDURE — 99214 OFFICE O/P EST MOD 30 MIN: CPT | Mod: 25 | Performed by: DERMATOLOGY

## 2019-08-27 ASSESSMENT — PAIN SCALES - GENERAL: PAINLEVEL: NO PAIN (0)

## 2019-08-27 NOTE — PATIENT INSTRUCTIONS
Cryotherapy    What is it?    Use of a very cold liquid, such as liquid nitrogen, to freeze and destroy abnormal skin cells that need to be removed    What should I expect?    Tenderness and redness    A small blister that might grow and fill with dark purple blood. There may be crusting.    More than one treatment may be needed if the lesions do not go away.    How do I care for the treated area?    Gently wash the area with your hands when bathing.    Use a thin layer of Vaseline to help with healing. You may use a Band-Aid.     The area should heal within 7-10 days and may leave behind a pink or lighter color.     Do not use an antibiotic or Neosporin ointment.     You may take acetaminophen (Tylenol) for pain.     Call your Doctor if you have:    Severe pain    Signs of infection (warmth, redness, cloudy yellow drainage, and or a bad smell)    Questions or concerns    Who should I call with questions?       [facility]: [phone]       [facility]: [phone]       For urgent needs outside of business hours call the [facility] at [phone]        and ask for the dermatology resident on call

## 2019-08-27 NOTE — LETTER
8/27/2019         RE: Uvaldo Ascencio  [address]  [place] [address]        Dear Colleague,    Thank you for referring your patient, Uvaldo Ascencio, to the [facility]. Please see a copy of my visit note below.    [facility] Dermatology Note      Dermatology Problem List:  1. BLK, left supraclavicular chest, s/p biopsy 2/21/19  2. Superficial BCC, left shin.   - s/p biopsy 2/21/2019, efudex cream x6 weeks.   3. Actinic keratoses, diffuse  - s/p efudex 5% cream BID for 3-4 weeks to scalp/face  - s/p cryotherapy    Encounter Date: Aug 27, 2019    CC:  Chief Complaint   Patient presents with     Skin Check     Hx of NMSC - no concerns         History of Present Illness:  Mr. Uvaldo Ascencio is a 68 year old male with a history of NMSC who presents as a follow up for treatment with efudex cream. He notes that the skin lesion treated has resolved entirely. He is not sure where it was it has healed so well. He has no new lesions of concern upon presentation today. Denies any tender, nonhealing, bleeding skin lesions. He spends a lot of time outside with his grandchildren and gardening. He has been wearing gloves and moisturizing regularly. He tries to wear hat when outside. No other concerns addressed today.      Past Medical History:   Patient Active Problem List   Diagnosis     GERD (gastroesophageal reflux disease)     CARDIOVASCULAR SCREENING; LDL GOAL LESS THAN 160     Knee pain     NSAID long-term use     Left inguinal hernia     Arthritis of knee     Tobacco use disorder     No past medical history on file.  Past Surgical History:   Procedure Laterality Date     APPENDECTOMY       ARTHROPLASTY KNEE UNICOMPARTMENT  01/27/12    [facility]     COLONOSCOPY WITH CO2 INSUFFLATION N/A 7/15/2016    Procedure: COLONOSCOPY WITH CO2 INSUFFLATION;  Surgeon: Kathy Dickson MD;  Location: [facility] OR     HERNIA REPAIR  2003    right sided       Social History:  Patient has  quit smoking. Patient is retired from [employer]. Patient is an avid connolly.  Reviewed and left in chart for clinician convenience.         Family History:  No family history of skin cancer.  Reviewed and left in chart for clinician convenience.       Medications:  Current Outpatient Medications   Medication Sig Dispense Refill     Naproxen Sodium (ALEVE PO)        Omeprazole Magnesium (PRILOSEC OTC PO)        fluorouracil (EFUDEX) 5 % external cream Apply twice daily as directed for 3-4 weeks as tolerated. (Patient not taking: Reported on 8/27/2019) 40 g 1       Allergies   Allergen Reactions     No Known Drug Allergies        Review of Systems:  -Constitutional: Patient is otherwise feeling well, in usual state of health.   -Skin: As above in HPI. No additional skin concerns.    Physical exam:  Vitals: There were no vitals taken for this visit.  GEN: This is a well developed, well-nourished male in no acute distress, in a pleasant mood.    SKIN: Total skin excluding the undergarment areas was performed. The exam included the head/face, neck, both arms, chest, back, abdomen, both legs, digits and/or nails and buttocks. Declines genital exam.   - Carlin Type II  - Scattered brown macules on sun exposed areas.  - Tanned exposed skin  - There are erythematous macules with overyling adherent scale: 2 on the vertex scalp, 1 right temple, 1 on left helix, 1 on left temple, 1 on right cheek, 2 on the nasal bridge  - There are dome shaped bright red papules on the trunk.   - Multiple regular brown pigmented macules and papules are identified on the body.   - There are waxy stuck on tan to brown papules on the tunk.  - scar from previous superficial BCC is hard to discern, but there is nothing concerning for a BCC on the left lower leg.   - No other lesions of concern on areas examined.       Impression/Plan:    1. History of NMSC. No evidence of recurrence.     Recommend sunscreens SPF #30 or greater, protective clothing  and avoidance of tanning beds.    2. Sun damaged skin with solar lentigines    Recommend sunscreens SPF #30 or greater, protective clothing and avoidance of tanning beds.    3. Benign findings including: seborrheic keratoses, cherry angioma    No further intervention required. Patient to report changes.     Patient reassured of the benign nature of these lesions.    4. Multiple clinically benign nevi    No further intervention required. Patient to report changes.     Patient reassured of the benign nature of these lesions.    5. Actinic keratosis. Discussed precancerous nature of these lesions. Recommended treatment to prevent progression to a squamous cell carcinoma. Advised patient to call for follow up if not resolved in 1 month.     Cryotherapy procedure note: After verbal consent and discussion of risks and benefits including but no limited to dyspigmentation/scar, blister, and pain, 10 was(were) treated with 1-2mm freeze border for 2 cycles with liquid nitrogen. Post cryotherapy instructions were provided.    Follow-up about 6 months for skin exam, sooner for lesions of concern.       Staff Involved:  Scribe/Staff    Scribe Disclosure  I, Teresa Winchester, am serving as a scribe to document services personally performed by Dr. Tess Chang MD, based on data collection and the provider's statements to me.     Provider Disclosure:   The documentation recorded by the scribe accurately reflects the services I personally performed and the decisions made by me.    Tess Chang MD    Department of Dermatology  [facility]: Phone: [phone], Fax:[phone]  [facility]: Phone: [phone], Fax: [phone]              Again, thank you for allowing me to participate in the care of your patient.        Sincerely,        Tess Chang MD

## 2019-08-27 NOTE — NURSING NOTE
@Uvaldo Ascencio's goals for this visit include:   Chief Complaint   Patient presents with     Skin Check     Hx of NMSC - no concerns        He requests these members of his care team be copied on today's visit information: NO    PCP: Kathy Dickson    Referring Provider:  No referring provider defined for this encounter.    There were no vitals taken for this visit.    Do you need any medication refills at today's visit? NO    Tomasa Larry CMA

## 2019-08-27 NOTE — PROGRESS NOTES
[facility] Dermatology Note      Dermatology Problem List:  1. BLK, left supraclavicular chest, s/p biopsy 2/21/19  2. Superficial BCC, left shin.   - s/p biopsy 2/21/2019, efudex cream x6 weeks.   3. Actinic keratoses, diffuse  - s/p efudex 5% cream BID for 3-4 weeks to scalp/face  - s/p cryotherapy    Encounter Date: Aug 27, 2019    CC:  Chief Complaint   Patient presents with     Skin Check     Hx of NMSC - no concerns         History of Present Illness:  Mr. Uvaldo Ascencio is a 68 year old male with a history of NMSC who presents as a follow up for treatment with efudex cream. He notes that the skin lesion treated has resolved entirely. He is not sure where it was it has healed so well. He has no new lesions of concern upon presentation today. Denies any tender, nonhealing, bleeding skin lesions. He spends a lot of time outside with his grandchildren and gardening. He has been wearing gloves and moisturizing regularly. He tries to wear hat when outside. No other concerns addressed today.      Past Medical History:   Patient Active Problem List   Diagnosis     GERD (gastroesophageal reflux disease)     CARDIOVASCULAR SCREENING; LDL GOAL LESS THAN 160     Knee pain     NSAID long-term use     Left inguinal hernia     Arthritis of knee     Tobacco use disorder     No past medical history on file.  Past Surgical History:   Procedure Laterality Date     APPENDECTOMY       ARTHROPLASTY KNEE UNICOMPARTMENT  01/27/12    [facility]     COLONOSCOPY WITH CO2 INSUFFLATION N/A 7/15/2016    Procedure: COLONOSCOPY WITH CO2 INSUFFLATION;  Surgeon: Kathy Dickson MD;  Location: [facility] OR     HERNIA REPAIR  2003    right sided       Social History:  Patient has quit smoking. Patient is retired from [employer]. Patient is an avid connolly.  Reviewed and left in chart for clinician convenience.         Family History:  No family history of skin cancer.  Reviewed and left in chart for clinician convenience.        Medications:  Current Outpatient Medications   Medication Sig Dispense Refill     Naproxen Sodium (ALEVE PO)        Omeprazole Magnesium (PRILOSEC OTC PO)        fluorouracil (EFUDEX) 5 % external cream Apply twice daily as directed for 3-4 weeks as tolerated. (Patient not taking: Reported on 8/27/2019) 40 g 1       Allergies   Allergen Reactions     No Known Drug Allergies        Review of Systems:  -Constitutional: Patient is otherwise feeling well, in usual state of health.   -Skin: As above in HPI. No additional skin concerns.    Physical exam:  Vitals: There were no vitals taken for this visit.  GEN: This is a well developed, well-nourished male in no acute distress, in a pleasant mood.    SKIN: Total skin excluding the undergarment areas was performed. The exam included the head/face, neck, both arms, chest, back, abdomen, both legs, digits and/or nails and buttocks. Declines genital exam.   - Carlin Type II  - Scattered brown macules on sun exposed areas.  - Tanned exposed skin  - There are erythematous macules with overyling adherent scale: 2 on the vertex scalp, 1 right temple, 1 on left helix, 1 on left temple, 1 on right cheek, 2 on the nasal bridge  - There are dome shaped bright red papules on the trunk.   - Multiple regular brown pigmented macules and papules are identified on the body.   - There are waxy stuck on tan to brown papules on the tunk.  - scar from previous superficial BCC is hard to discern, but there is nothing concerning for a BCC on the left lower leg.   - No other lesions of concern on areas examined.       Impression/Plan:    1. History of NMSC. No evidence of recurrence.     Recommend sunscreens SPF #30 or greater, protective clothing and avoidance of tanning beds.    2. Sun damaged skin with solar lentigines    Recommend sunscreens SPF #30 or greater, protective clothing and avoidance of tanning beds.    3. Benign findings including: seborrheic keratoses, cherry  angioma    No further intervention required. Patient to report changes.     Patient reassured of the benign nature of these lesions.    4. Multiple clinically benign nevi    No further intervention required. Patient to report changes.     Patient reassured of the benign nature of these lesions.    5. Actinic keratosis. Discussed precancerous nature of these lesions. Recommended treatment to prevent progression to a squamous cell carcinoma. Advised patient to call for follow up if not resolved in 1 month.     Cryotherapy procedure note: After verbal consent and discussion of risks and benefits including but no limited to dyspigmentation/scar, blister, and pain, 10 was(were) treated with 1-2mm freeze border for 2 cycles with liquid nitrogen. Post cryotherapy instructions were provided.    Follow-up about 6 months for skin exam, sooner for lesions of concern.       Staff Involved:  Scribe/Staff    Scribe Disclosure  I, Teresa Winchester, am serving as a scribe to document services personally performed by Dr. Tess Chang MD, based on data collection and the provider's statements to me.     Provider Disclosure:   The documentation recorded by the scribe accurately reflects the services I personally performed and the decisions made by me.    Tess Chagn MD    Department of Dermatology  [facility]: Phone: [phone], Fax:[phone]  [facility]: Phone: [phone], Fax: [phone]

## 2020-01-23 ENCOUNTER — TRANSFERRED RECORDS (OUTPATIENT)
Dept: HEALTH INFORMATION MANAGEMENT | Facility: CLINIC | Age: 70
End: 2020-01-23

## 2020-02-13 NOTE — PROGRESS NOTES
[facility] [place]  [address], [address]  [place] [address]  [phone]  Dept: [phone]    PRE-OP EVALUATION:  Today's date: 2020    Uvaldo Ascencio (: 1950) presents for pre-operative evaluation assessment as requested by Dr. Egan . He requires evaluation and anesthesia risk assessment prior to undergoing surgery/procedure for treatment of  Half replacement of Right knee .    Fax number for surgical facility: [facility] -  FAX-  [phone]  Primary Physician: Kathy Dickson  Type of Anesthesia Anticipated: General    Patient has a Health Care Directive or Living Will:  NO    Preop Questions 2020   Who is doing your surgery? Dr Egan   What are you having done? [facility]   Date of Surgery/Procedure: March 10 2020   Facility or Hospital where procedure/surgery will be performed: [facility]   1.  Do you have a history of Heart attack, stroke, stent, coronary bypass surgery, or other heart surgery? No   2.  Do you ever have any pain or discomfort in your chest? No   3.  Do you have a history of  Heart Failure? No   4.   Are you troubled by shortness of breath when:  walking on a level surface, or up a slight hill, or at night? No   5.  Do you currently have a cold, bronchitis or other respiratory infection? No   6.  Do you have a cough, shortness of breath, or wheezing? No   7.  Do you sometimes get pains in the calves of your legs when you walk? No   8. Do you or anyone in your family have previous history of blood clots? No   9.  Do you or does anyone in your family have a serious bleeding problem such as prolonged bleeding following surgeries or cuts? No   10. Have you ever had problems with anemia or been told to take iron pills? No   11. Have you had any abnormal blood loss such as black, tarry or bloody stools? No   12. Have you ever had a blood transfusion? No   13. Have you or any of your relatives ever had problems with anesthesia? No   14. Do you have sleep  apnea, excessive snoring or daytime drowsiness? No   15. Do you have any prosthetic heart valves? No   16. Do you have prosthetic joints? YES - left knee         HPI:     HPI related to upcoming procedure: patient is scheduled for right knee partial replacement due to arthritis. He had this completed on the left in the past and has done well with that.      GERD - doing well with dietary changes. Taking Omeprazole over the counter. No vomiting, or change in bowels.     MEDICAL HISTORY:     Patient Active Problem List    Diagnosis Date Noted     Tobacco use disorder 04/29/2014     Priority: Medium     Arthritis of knee 01/24/2012     Priority: Medium     Knee pain 04/05/2011     Priority: Medium     Bilateral         NSAID long-term use 04/05/2011     Priority: Medium     Left inguinal hernia 04/05/2011     Priority: Medium     GERD (gastroesophageal reflux disease) 01/31/2011     Priority: Medium     CARDIOVASCULAR SCREENING; LDL GOAL LESS THAN 160 01/31/2011     Priority: Medium      History reviewed. No pertinent past medical history.  Past Surgical History:   Procedure Laterality Date     APPENDECTOMY      as a teen     ARTHROPLASTY KNEE UNICOMPARTMENT  01/27/12    [facility]     COLONOSCOPY WITH CO2 INSUFFLATION N/A 7/15/2016    Procedure: COLONOSCOPY WITH CO2 INSUFFLATION;  Surgeon: Kathy Dickson MD;  Location: [facility] OR     HERNIA REPAIR Right 2003    right sided     HERNIA REPAIR Left      Current Outpatient Medications   Medication Sig Dispense Refill     Naproxen Sodium (ALEVE PO)        Omeprazole Magnesium (PRILOSEC OTC PO)        OTC products: None, except as noted above    Allergies   Allergen Reactions     No Known Drug Allergies       Latex Allergy: NO    Social History     Tobacco Use     Smoking status: Former Smoker     Types: Cigars     Smokeless tobacco: Never Used   Substance Use Topics     Alcohol use: Yes     Comment: 0-2 per week      History   Drug Use No       REVIEW OF SYSTEMS:  "  CONSTITUTIONAL: NEGATIVE for fever, chills, change in weight  INTEGUMENTARY/SKIN: NEGATIVE for worrisome rashes, moles or lesions  EYES: NEGATIVE for vision changes or irritation  ENT/MOUTH: NEGATIVE for ear, mouth and throat problems  RESP: NEGATIVE for significant cough or SOB  CV: NEGATIVE for chest pain, palpitations or peripheral edema  GI: NEGATIVE for nausea, abdominal pain, heartburn, or change in bowel habits  : NEGATIVE for frequency, dysuria, or hematuria  MUSCULOSKELETAL:as above.   NEURO: NEGATIVE for weakness, dizziness or paresthesias  ENDOCRINE: NEGATIVE for temperature intolerance, skin/hair changes  HEME: NEGATIVE for bleeding problems  PSYCHIATRIC: NEGATIVE for changes in mood or affect    EXAM:   /86   Pulse 62   Temp 97.8  F (36.6  C) (Oral)   Resp 14   Ht 1.685 m (5' 6.34\")   Wt 75.8 kg (167 lb)   SpO2 98%   BMI 26.68 kg/m      GENERAL APPEARANCE: healthy, alert and no distress     EYES: EOMI,  PERRL     HENT: ear canals and TM's normal and nose and mouth without ulcers or lesions     NECK: no adenopathy, no asymmetry, masses, or scars and thyroid normal to palpation     RESP: lungs clear to auscultation - no rales, rhonchi or wheezes     CV: regular rates and rhythm, normal S1 S2, no S3 or S4 and no murmur, click or rub     ABDOMEN:  soft, nontender, no HSM or masses and bowel sounds normal     MS: no lower extremity edema.      SKIN: no suspicious lesions or rashes     NEURO: Normal strength and tone, sensory exam grossly normal, mentation intact and speech normal     PSYCH: mentation appears normal. and affect normal/bright     LYMPHATICS: No cervical adenopathy    DIAGNOSTICS:     EKG: appears normal, NSR, normal axis, normal intervals, no acute ST/T changes c/w ischemia, no LVH by voltage criteria, unchanged from previous tracings  Labs Resulted Today:   Results for orders placed or performed in visit on 02/18/20   CBC with platelets differential     Status: None   Result " Value Ref Range    WBC 7.1 4.0 - 11.0 10e9/L    RBC Count 4.44 4.4 - 5.9 10e12/L    Hemoglobin 13.8 13.3 - 17.7 g/dL    Hematocrit 40.9 40.0 - 53.0 %    MCV 92 78 - 100 fl    MCH 31.1 26.5 - 33.0 pg    MCHC 33.7 31.5 - 36.5 g/dL    RDW 13.4 10.0 - 15.0 %    Platelet Count 336 150 - 450 10e9/L    % Neutrophils 55.9 %    % Lymphocytes 30.6 %    % Monocytes 9.7 %    % Eosinophils 3.2 %    % Basophils 0.6 %    Absolute Neutrophil 4.0 1.6 - 8.3 10e9/L    Absolute Lymphocytes 2.2 0.8 - 5.3 10e9/L    Absolute Monocytes 0.7 0.0 - 1.3 10e9/L    Absolute Eosinophils 0.2 0.0 - 0.7 10e9/L    Absolute Basophils 0.0 0.0 - 0.2 10e9/L    Diff Method Automated Method    Basic metabolic panel     Status: None   Result Value Ref Range    Sodium 141 133 - 144 mmol/L    Potassium 4.2 3.4 - 5.3 mmol/L    Chloride 107 94 - 109 mmol/L    Carbon Dioxide 29 20 - 32 mmol/L    Anion Gap 5 3 - 14 mmol/L    Glucose 96 70 - 99 mg/dL    Urea Nitrogen 21 7 - 30 mg/dL    Creatinine 0.97 0.66 - 1.25 mg/dL    GFR Estimate 80 >60 mL/min/[1.73_m2]    GFR Estimate If Black >90 >60 mL/min/[1.73_m2]    Calcium 9.2 8.5 - 10.1 mg/dL     Labs Drawn and in Process:   Unresulted Labs Ordered in the Past 30 Days of this Admission     No orders found from 1/19/2020 to 2/19/2020.          Recent Labs   Lab Test 05/04/16  1341 01/24/12  1357   HGB  --  13.3     --    POTASSIUM 4.1  --    CR 0.98  --         IMPRESSION:   Reason for surgery/procedure: osteoarthritis right knee  Diagnosis/reason for consult: GERD, history of long term use of NSAID, personal history of tobacco use.     The proposed surgical procedure is considered INTERMEDIATE risk.    REVISED CARDIAC RISK INDEX  The patient has the following serious cardiovascular risks for perioperative complications such as (MI, PE, VFib and 3  AV Block):  No serious cardiac risks  INTERPRETATION: 0 risks: Class I (very low risk - 0.4% complication rate)    The patient has the following additional risks for  perioperative complications:  No identified additional risks      ICD-10-CM    1. Preop general physical exam Z01.818 EKG 12-lead complete w/read - Clinics   2. Primary osteoarthritis of right knee M17.11    3. NSAID long-term use Z79.1 CBC with platelets differential     Basic metabolic panel   4. Gastroesophageal reflux disease without esophagitis K21.9    5. Personal history of tobacco use Z87.891 Prof Fee: Shared Decision Making Visit for Lung Cancer Screening       RECOMMENDATIONS:         --Patient is to take all scheduled medications on the day of surgery EXCEPT for modifications listed below.    Anticoagulant or Antiplatelet Medication Use  NSAIDS: Naproxen (Naprosyn):   Stop 5-7 days prior to surgery        APPROVAL GIVEN to proceed with proposed procedure, without further diagnostic evaluation       Signed Electronically by: Kathy Dickson MD    Copy of this evaluation report is provided to requesting physician.    [facility] Preop Guidelines    Revised Cardiac Risk Index  Lung Cancer Screening Shared Decision Making Visit     Uvaldo Ascencio is not eligible for lung cancer screening on the basis of the information provided in my signed lung cancer screening order. Uvaldo's smoking history is below the threshold and so it is not recommended.    Patient is currently a smoker and so we did discuss that the only way to prevent lung cancer is to not smoke. Smoking cessation assistance was offered.    ShouldIScreen

## 2020-02-18 ENCOUNTER — OFFICE VISIT (OUTPATIENT)
Dept: FAMILY MEDICINE | Facility: CLINIC | Age: 70
End: 2020-02-18
Payer: MEDICARE

## 2020-02-18 VITALS
HEART RATE: 62 BPM | DIASTOLIC BLOOD PRESSURE: 86 MMHG | TEMPERATURE: 97.8 F | HEIGHT: 66 IN | WEIGHT: 167 LBS | OXYGEN SATURATION: 98 % | RESPIRATION RATE: 14 BRPM | BODY MASS INDEX: 26.84 KG/M2 | SYSTOLIC BLOOD PRESSURE: 110 MMHG

## 2020-02-18 DIAGNOSIS — K21.9 GASTROESOPHAGEAL REFLUX DISEASE WITHOUT ESOPHAGITIS: ICD-10-CM

## 2020-02-18 DIAGNOSIS — Z79.1 NSAID LONG-TERM USE: ICD-10-CM

## 2020-02-18 DIAGNOSIS — Z01.818 PREOP GENERAL PHYSICAL EXAM: Primary | ICD-10-CM

## 2020-02-18 DIAGNOSIS — M17.11 PRIMARY OSTEOARTHRITIS OF RIGHT KNEE: ICD-10-CM

## 2020-02-18 DIAGNOSIS — Z87.891 PERSONAL HISTORY OF TOBACCO USE: ICD-10-CM

## 2020-02-18 LAB
ANION GAP SERPL CALCULATED.3IONS-SCNC: 5 MMOL/L (ref 3–14)
BASOPHILS # BLD AUTO: 0 10E9/L (ref 0–0.2)
BASOPHILS NFR BLD AUTO: 0.6 %
BUN SERPL-MCNC: 21 MG/DL (ref 7–30)
CALCIUM SERPL-MCNC: 9.2 MG/DL (ref 8.5–10.1)
CHLORIDE SERPL-SCNC: 107 MMOL/L (ref 94–109)
CO2 SERPL-SCNC: 29 MMOL/L (ref 20–32)
CREAT SERPL-MCNC: 0.97 MG/DL (ref 0.66–1.25)
DIFFERENTIAL METHOD BLD: NORMAL
EOSINOPHIL # BLD AUTO: 0.2 10E9/L (ref 0–0.7)
EOSINOPHIL NFR BLD AUTO: 3.2 %
ERYTHROCYTE [DISTWIDTH] IN BLOOD BY AUTOMATED COUNT: 13.4 % (ref 10–15)
GFR SERPL CREATININE-BSD FRML MDRD: 80 ML/MIN/{1.73_M2}
GLUCOSE SERPL-MCNC: 96 MG/DL (ref 70–99)
HCT VFR BLD AUTO: 40.9 % (ref 40–53)
HGB BLD-MCNC: 13.8 G/DL (ref 13.3–17.7)
LYMPHOCYTES # BLD AUTO: 2.2 10E9/L (ref 0.8–5.3)
LYMPHOCYTES NFR BLD AUTO: 30.6 %
MCH RBC QN AUTO: 31.1 PG (ref 26.5–33)
MCHC RBC AUTO-ENTMCNC: 33.7 G/DL (ref 31.5–36.5)
MCV RBC AUTO: 92 FL (ref 78–100)
MONOCYTES # BLD AUTO: 0.7 10E9/L (ref 0–1.3)
MONOCYTES NFR BLD AUTO: 9.7 %
NEUTROPHILS # BLD AUTO: 4 10E9/L (ref 1.6–8.3)
NEUTROPHILS NFR BLD AUTO: 55.9 %
PLATELET # BLD AUTO: 336 10E9/L (ref 150–450)
POTASSIUM SERPL-SCNC: 4.2 MMOL/L (ref 3.4–5.3)
RBC # BLD AUTO: 4.44 10E12/L (ref 4.4–5.9)
SODIUM SERPL-SCNC: 141 MMOL/L (ref 133–144)
WBC # BLD AUTO: 7.1 10E9/L (ref 4–11)

## 2020-02-18 PROCEDURE — 85025 COMPLETE CBC W/AUTO DIFF WBC: CPT | Performed by: FAMILY MEDICINE

## 2020-02-18 PROCEDURE — 80048 BASIC METABOLIC PNL TOTAL CA: CPT | Performed by: FAMILY MEDICINE

## 2020-02-18 PROCEDURE — 36415 COLL VENOUS BLD VENIPUNCTURE: CPT | Performed by: FAMILY MEDICINE

## 2020-02-18 PROCEDURE — 99215 OFFICE O/P EST HI 40 MIN: CPT | Performed by: FAMILY MEDICINE

## 2020-02-18 PROCEDURE — 93000 ELECTROCARDIOGRAM COMPLETE: CPT | Performed by: FAMILY MEDICINE

## 2020-02-18 ASSESSMENT — MIFFLIN-ST. JEOR: SCORE: 1475.66

## 2020-03-10 ENCOUNTER — TRANSFERRED RECORDS (OUTPATIENT)
Dept: HEALTH INFORMATION MANAGEMENT | Facility: CLINIC | Age: 70
End: 2020-03-10

## 2020-03-11 LAB
CREAT SERPL-MCNC: 0.9 MG/DL (ref 0.72–1.25)
GFR SERPL CREATININE-BSD FRML MDRD: >60 ML/MIN/1.73M2

## 2020-03-19 ENCOUNTER — TRANSFERRED RECORDS (OUTPATIENT)
Dept: HEALTH INFORMATION MANAGEMENT | Facility: CLINIC | Age: 70
End: 2020-03-19

## 2020-03-31 ENCOUNTER — TELEPHONE (OUTPATIENT)
Dept: DERMATOLOGY | Facility: CLINIC | Age: 70
End: 2020-03-31

## 2020-03-31 NOTE — TELEPHONE ENCOUNTER
Rescheduled appointment with Dr. Chang due to Coronavirus Pandemic. No further questions or concerns at this time.  Katya Roca LPN

## 2020-04-30 ENCOUNTER — TRANSFERRED RECORDS (OUTPATIENT)
Dept: HEALTH INFORMATION MANAGEMENT | Facility: CLINIC | Age: 70
End: 2020-04-30

## 2020-09-22 ENCOUNTER — OFFICE VISIT (OUTPATIENT)
Dept: DERMATOLOGY | Facility: CLINIC | Age: 70
End: 2020-09-22
Payer: MEDICARE

## 2020-09-22 DIAGNOSIS — L57.0 ACTINIC KERATOSIS: Primary | ICD-10-CM

## 2020-09-22 DIAGNOSIS — Z85.828 HISTORY OF NONMELANOMA SKIN CANCER: ICD-10-CM

## 2020-09-22 DIAGNOSIS — L82.1 SEBORRHEIC KERATOSIS: ICD-10-CM

## 2020-09-22 DIAGNOSIS — D18.01 CHERRY ANGIOMA: ICD-10-CM

## 2020-09-22 DIAGNOSIS — L57.8 SUN-DAMAGED SKIN: ICD-10-CM

## 2020-09-22 DIAGNOSIS — R23.8 VENOUS LAKE OF LIP: ICD-10-CM

## 2020-09-22 DIAGNOSIS — D22.9 MULTIPLE BENIGN NEVI: ICD-10-CM

## 2020-09-22 PROCEDURE — 17003 DESTRUCT PREMALG LES 2-14: CPT | Performed by: DERMATOLOGY

## 2020-09-22 PROCEDURE — 17000 DESTRUCT PREMALG LESION: CPT | Performed by: DERMATOLOGY

## 2020-09-22 PROCEDURE — 99214 OFFICE O/P EST MOD 30 MIN: CPT | Mod: 25 | Performed by: DERMATOLOGY

## 2020-09-22 ASSESSMENT — PAIN SCALES - GENERAL: PAINLEVEL: NO PAIN (0)

## 2020-09-22 NOTE — LETTER
9/22/2020         RE: Uvaldo Ascencio  [address]  [place] [address]        Dear Colleague,    Thank you for referring your patient, Uvaldo Ascencio, to the [facility]. Please see a copy of my visit note below.    [facility] Dermatology Note      Dermatology Problem List:  1. Hx of NMSC  - Superficial BCC, left shin, s/p biopsy 2/21/2019, efudex cream x6 weeks.   2. Actinic keratoses, diffuse  - s/p efudex 5% cream BID for 3-4 weeks to scalp/face  - s/p cryotherapy  3. BLK, left supraclavicular chest, s/p biopsy 2/21/19    Encounter Date: Sep 22, 2020    CC:  Chief Complaint   Patient presents with     Skin Check     Hx NMSC, no family hx SC. Not immunosuppressed. Areas of concern: none       History of Present Illness:  Mr. Uvaldo Ascencio is a 69 year old male with a history of NMSC who presents for skin check.    He was last seen on 8/27/19 to follow up after treating skin cancer on the left shin with efudex cream. At that time, 10 AKs were treated with cryotherapy.    Today, the patient reports concerns about no specific skin lesions. He has not noticed anything tender, nonhealing, or bleeding.    No other concerns addressed today.        Past Medical History:   Patient Active Problem List   Diagnosis     GERD (gastroesophageal reflux disease)     CARDIOVASCULAR SCREENING; LDL GOAL LESS THAN 160     Knee pain     NSAID long-term use     Left inguinal hernia     Arthritis of knee     Tobacco use disorder     No past medical history on file.  Past Surgical History:   Procedure Laterality Date     APPENDECTOMY      as a teen     ARTHROPLASTY KNEE UNICOMPARTMENT  01/27/12    [facility]     COLONOSCOPY WITH CO2 INSUFFLATION N/A 7/15/2016    Procedure: COLONOSCOPY WITH CO2 INSUFFLATION;  Surgeon: Kathy Dickson MD;  Location: [facility] OR     HERNIA REPAIR Right 2003    right sided     HERNIA REPAIR Left        Social History:  Patient has quit smoking. Patient is  retired from [employer]. 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 Spends a lot of time outside with grandchildren. Tries to wear hat when outdoors.  Reviewed and left in chart for clinician convenience.         Family History:  No family history of skin cancer.  Reviewed and left in chart for clinician convenience.       Medications:  Current Outpatient Medications   Medication Sig Dispense Refill     Naproxen Sodium (ALEVE PO)        Omeprazole Magnesium (PRILOSEC OTC PO)          Allergies   Allergen Reactions     No Known Drug Allergies        Review of Systems:  -Constitutional: Patient is otherwise feeling well, in usual state of health.   -Skin: As above in HPI. No additional skin concerns.    Physical exam:  Vitals: There were no vitals taken for this visit.  GEN: This is a well developed, well-nourished male in no acute distress, in a pleasant mood.    SKIN: Total skin excluding the undergarment areas was performed. The exam included the head/face, neck, both arms, chest, back, abdomen, both legs, digits and/or nails and buttocks. Declines genital exam.   - Carlin Type II  - Scattered brown macules on sun exposed areas. Numerous on the scalp.  - Tanned exposed skin  - There are dome shaped bright red papules on the trunk.   - Multiple regular brown pigmented macules and papules are identified on the body. Less than 30 true nevi. None with significant atypia.  - There are waxy stuck on tan to brown papules on the trunk.  - scar from previous superficial BCC is hard to find, but there is nothing concerning for a BCC on the left lower leg.    - Gritty pink macules on vertex scalp, right frontal scalp, nasal bridge  - Venous lake on the lower lip  - No other lesions of concern on areas examined.      Impression/Plan:    1. History of NMSC. No evidence of recurrence.     Recommend sunscreens SPF #30 or greater, protective clothing and avoidance of tanning beds.    2. Sun damaged skin with solar  lentigines    Recommend sunscreens SPF #30 or greater, protective clothing and avoidance of tanning beds.    3. Benign findings including: seborrheic keratoses, cherry angioma, venous lake    No further intervention required. Patient to report changes.     Patient reassured of the benign nature of these lesions.    4. Multiple clinically benign nevi    No further intervention required. Patient to report changes.     Patient reassured of the benign nature of these lesions.    5. Actinic keratosis. Right frontal scalp, vertex scalp, nasal bridge. Discussed precancerous nature of these lesions. Recommended treatment to prevent progression to a squamous cell carcinoma. Advised patient to call for follow up if not resolved in 1 month.     Cryotherapy procedure note: After verbal consent and discussion of risks and benefits including but no limited to dyspigmentation/scar, blister, and pain, 3 AKs were treated with 1-2mm freeze border for 2 cycles with liquid nitrogen. Post cryotherapy instructions were provided.    Follow-up about 12 months for skin exam, sooner for lesions of concern.       Staff Involved:  Scribe/Staff    Scribe Disclosure  I, Shellie Mckay LPN am serving as a scribe to document services personally performed by Dr. Tess Chang MD, based on data collection and the provider's statements to me.       Provider Disclosure:   The documentation recorded by the scribe accurately reflects the services I personally performed and the decisions made by me.    Tess Chang MD    Department of Dermatology  [facility]: Phone: [phone], Fax:[phone]  [facility]: Phone: [phone], Fax: [phone]              Uvaldo Ascencio's goals for this visit include:   Chief Complaint   Patient presents with     Skin Check     Hx NMSC, no family hx SC. Not immunosuppressed. Areas  of concern: none       He requests these members of his care team be copied on today's visit information: no    PCP: Kathy Dickson    Referring Provider:  No referring provider defined for this encounter.    There were no vitals taken for this visit.    Do you need any medication refills at today's visit? No  Katya Roca LPN        Again, thank you for allowing me to participate in the care of your patient.        Sincerely,        Tess Chang MD

## 2020-09-22 NOTE — PROGRESS NOTES
Uvaldo Ascencio's goals for this visit include:   Chief Complaint   Patient presents with     Skin Check     Hx NMSC, no family hx SC. Not immunosuppressed. Areas of concern: none       He requests these members of his care team be copied on today's visit information: no    PCP: Kathy Dickson    Referring Provider:  No referring provider defined for this encounter.    There were no vitals taken for this visit.    Do you need any medication refills at today's visit? No  Katya Roca LPN

## 2020-09-22 NOTE — PATIENT INSTRUCTIONS
Cryotherapy    What is it?    Use of a very cold liquid, such as liquid nitrogen, to freeze and destroy abnormal skin cells that need to be removed    What should I expect?    Tenderness and redness    A small blister that might grow and fill with dark purple blood. There may be crusting.    More than one treatment may be needed if the lesions do not go away.    How do I care for the treated area?    Gently wash the area with your hands when bathing.    Use a thin layer of Vaseline to help with healing. You may use a Band-Aid.     The area should heal within 7-10 days and may leave behind a pink or lighter color.     Do not use an antibiotic or Neosporin ointment.     You may take acetaminophen (Tylenol) for pain.     Call your Doctor if you have:    Severe pain    Signs of infection (warmth, redness, cloudy yellow drainage, and or a bad smell)    Questions or concerns    Who should I call with questions?       [facility]: [phone]       [facility]: [phone]       For urgent needs outside of business hours call the [facility] at [phone]        and ask for the dermatology resident on call

## 2021-02-27 ENCOUNTER — HEALTH MAINTENANCE LETTER (OUTPATIENT)
Age: 71
End: 2021-02-27

## 2021-04-27 NOTE — PROGRESS NOTES
Please sign refill of entecavir. Thank you.    [facility] Dermatology Note      Dermatology Problem List:  1. Hx of NMSC  - Superficial BCC, left shin, s/p biopsy 2/21/2019, efudex cream x6 weeks.   2. Actinic keratoses, diffuse  - s/p efudex 5% cream BID for 3-4 weeks to scalp/face  - s/p cryotherapy  3. BLK, left supraclavicular chest, s/p biopsy 2/21/19    Encounter Date: Sep 22, 2020    CC:  Chief Complaint   Patient presents with     Skin Check     Hx NMSC, no family hx SC. Not immunosuppressed. Areas of concern: none       History of Present Illness:  Mr. Uvaldo Ascencio is a 69 year old male with a history of NMSC who presents for skin check.    He was last seen on 8/27/19 to follow up after treating skin cancer on the left shin with efudex cream. At that time, 10 AKs were treated with cryotherapy.    Today, the patient reports concerns about no specific skin lesions. He has not noticed anything tender, nonhealing, or bleeding.    No other concerns addressed today.        Past Medical History:   Patient Active Problem List   Diagnosis     GERD (gastroesophageal reflux disease)     CARDIOVASCULAR SCREENING; LDL GOAL LESS THAN 160     Knee pain     NSAID long-term use     Left inguinal hernia     Arthritis of knee     Tobacco use disorder     No past medical history on file.  Past Surgical History:   Procedure Laterality Date     APPENDECTOMY      as a teen     ARTHROPLASTY KNEE UNICOMPARTMENT  01/27/12    [facility]     COLONOSCOPY WITH CO2 INSUFFLATION N/A 7/15/2016    Procedure: COLONOSCOPY WITH CO2 INSUFFLATION;  Surgeon: Kathy Dickson MD;  Location: [facility] OR     HERNIA REPAIR Right 2003    right sided     HERNIA REPAIR Left        Social History:  Patient has quit smoking. Patient is retired from [employer]. 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 Spends a lot of time outside with grandchildren. Tries to wear hat when outdoors.  Reviewed and left in chart for clinician convenience.         Family History:  No family history  of skin cancer.  Reviewed and left in chart for clinician convenience.       Medications:  Current Outpatient Medications   Medication Sig Dispense Refill     Naproxen Sodium (ALEVE PO)        Omeprazole Magnesium (PRILOSEC OTC PO)          Allergies   Allergen Reactions     No Known Drug Allergies        Review of Systems:  -Constitutional: Patient is otherwise feeling well, in usual state of health.   -Skin: As above in HPI. No additional skin concerns.    Physical exam:  Vitals: There were no vitals taken for this visit.  GEN: This is a well developed, well-nourished male in no acute distress, in a pleasant mood.    SKIN: Total skin excluding the undergarment areas was performed. The exam included the head/face, neck, both arms, chest, back, abdomen, both legs, digits and/or nails and buttocks. Declines genital exam.   - Carlin Type II  - Scattered brown macules on sun exposed areas. Numerous on the scalp.  - Tanned exposed skin  - There are dome shaped bright red papules on the trunk.   - Multiple regular brown pigmented macules and papules are identified on the body. Less than 30 true nevi. None with significant atypia.  - There are waxy stuck on tan to brown papules on the trunk.  - scar from previous superficial BCC is hard to find, but there is nothing concerning for a BCC on the left lower leg.    - Gritty pink macules on vertex scalp, right frontal scalp, nasal bridge  - Venous lake on the lower lip  - No other lesions of concern on areas examined.      Impression/Plan:    1. History of NMSC. No evidence of recurrence.     Recommend sunscreens SPF #30 or greater, protective clothing and avoidance of tanning beds.    2. Sun damaged skin with solar lentigines    Recommend sunscreens SPF #30 or greater, protective clothing and avoidance of tanning beds.    3. Benign findings including: seborrheic keratoses, cherry angioma, venous lake    No further intervention required. Patient to report changes.      Patient reassured of the benign nature of these lesions.    4. Multiple clinically benign nevi    No further intervention required. Patient to report changes.     Patient reassured of the benign nature of these lesions.    5. Actinic keratosis. Right frontal scalp, vertex scalp, nasal bridge. Discussed precancerous nature of these lesions. Recommended treatment to prevent progression to a squamous cell carcinoma. Advised patient to call for follow up if not resolved in 1 month.     Cryotherapy procedure note: After verbal consent and discussion of risks and benefits including but no limited to dyspigmentation/scar, blister, and pain, 3 AKs were treated with 1-2mm freeze border for 2 cycles with liquid nitrogen. Post cryotherapy instructions were provided.    Follow-up about 12 months for skin exam, sooner for lesions of concern.       Staff Involved:  Scribe/Staff    Scribe Disclosure  I, Shellie Mckay LPN am serving as a scribe to document services personally performed by Dr. Tess Chang MD, based on data collection and the provider's statements to me.       Provider Disclosure:   The documentation recorded by the scribe accurately reflects the services I personally performed and the decisions made by me.    Tess Chang MD    Department of Dermatology  [facility]: Phone: [phone], Fax:[phone]  [facility]: Phone: [phone], Fax: [phone]

## 2021-07-06 ENCOUNTER — MYC MEDICAL ADVICE (OUTPATIENT)
Dept: PEDIATRICS | Facility: CLINIC | Age: 71
End: 2021-07-06

## 2021-07-06 NOTE — LETTER
July 20, 2021      Uvaldo Ascencio  [address] [place] [address]        Dear Uvaldo Ascencio,    In order to ensure that we are providing the best quality care, we would like to remind you that you are due for a return in person appointment with Dr Chang around 9/13/21.      We have been unable to reach you by phone (or [employer]). Please call your clinic or use [facility] to make an appointment with your provider. If any medication is prescribed to you by this provider, please be sure to schedule on or around the date stated above so you do not run out of medication.  Please let us know if you have any questions and we would be happy to help.     Thank you for trusting us with your care.    Sincerely,     [facility]  [phone]

## 2021-07-13 NOTE — TELEPHONE ENCOUNTER
2nd attempt. Left message for patient to return call.     Patient is due for a return in person appointment with Dr Chang due around 9/13/21.     Number to clinic and Mychart option given, please assist in scheduling once patient returns clinic call.     Call Center OKAY TO SCHEDULE.    Thanks,   Margarita Onofre  Surgical Specialties Procedure   [facility] [place]  7/13/2021 11:45 AM

## 2021-07-20 NOTE — TELEPHONE ENCOUNTER
3rd attempt. Patient has not called to schedule.  Appointment reminder letter sent to patient.      Margarita Onofre  Surgical Specialties Procedure   [facility] [place]  7/20/2021 7:58 AM

## 2021-09-13 NOTE — PROGRESS NOTES
[facility] Dermatology Note  Encounter Date: Sep 14, 2021  Office Visit     Dermatology Problem List:  Last skin check 9/14/21, recommended yearly.  1. Hx of NMSC  - Superficial BCC, left shin, s/p biopsy 2/21/2019, efudex cream x6 weeks.   2. Actinic keratoses, diffuse  - s/p efudex 5% cream BID for 3-4 weeks to scalp/face, initiated 2/21/2019  - s/p cryotherapy  3. BLK, left supraclavicular chest, s/p biopsy 2/21/19    Social history: Patient is retired from [employer]. Patient is an avid connolly and wood worker. Spends a lot of time outside with grandchildren. Tries to wear hat when outdoors.  Family history: Negative for skin cancer.  ____________________________________________    ASSESSMENT/PLAN:    # History of NMSC. No evidence of recurrence.   - Recommend sunscreens SPF #30 or greater, protective clothing and avoidance of tanning beds.   - Recommended yearly skin exams.    # Sun damaged skin with solar lentigines. Chronic. Stable.   - Recommended sunscreens SPF #30 or greater, protective clothing and avoidance of tanning beds.     # Benign findings include: seborrheic keratoses, cherry angioma, millia. Self limited, minor.   - No further intervention required. Patient to report changes.  - Patient reassured of the benign nature of these lesions.    # Multiple clinically benign nevi. Chronic. Stable.   - No further intervention required. Patient to report changes.  - Patient reassured of the benign nature of these lesions.    # Actinic keratosis, crown of scalp and nasal bridge. Discussed precancerous nature of these lesions. Recommended treatment to prevent progression to a squamous cell carcinoma. Advised patient to call for follow up if not resolved in 1 month.    - See cryo procedure note.    Procedures Performed:   - Cryotherapy procedure note, location(s): crown of scalp. After verbal consent and discussion of risks and benefits including, but not limited to, dyspigmentation/scar, blister,  and pain, 2 lesion(s) was(were) treated with 1-2 mm freeze border for 1-2 cycles with liquid nitrogen. Post cryotherapy instructions were provided.    Follow-up: 1 year(s) in-person, or earlier for new or changing lesions    Staff and Scribe:     Scribe Disclosure:   I, Alistaircheryle lamar, am serving as a scribe to document services personally performed by this physician, Dr. Tess Chang, based on data collection and the provider's statements to me.     Provider Disclosure:   The documentation recorded by the scribe accurately reflects the services I personally performed and the decisions made by me.    Tess Chang MD    Department of Dermatology  [facility]: Phone: [phone], Fax:[phone]  [facility]: Phone: [phone], Fax: [phone]    ____________________________________________    CC: Skin Check (Full skin check. No areas or concern. Hx of NMSC)    HPI:  Mr. Uvaldo Ascencio is a(n) 70 year old male who presents today as a return patient for skin check.    Last seen 9/22/2020. At that time, cryo was performed on 3 AKs (right frontal scalp, vertex scalp, and nasal bridge).    Today patient notes no areas of concern.    Patient is otherwise feeling well, without additional concerns.    Labs:  NA    Physical exam:  Vitals: There were no vitals taken for this visit.  SKIN: Total skin excluding the undergarment areas was performed. The exam included the head/face, neck, both arms, chest, back, abdomen, both legs, digits and/or nails.   - Carlin Type II-III  - Scattered brown macules on sun exposed areas. Numerous on the scalp.  - There are dome shaped bright red papules on the trunk.  - Multiple regular brown pigmented macules and papules are identified on the body. Less than 30 true nevi. None with significant atypia.  - There are waxy stuck on tan to brown papules on  the trunk.  - Scar from previous superficial BCC is hard to find, but there is nothing concerning for a BCC on the left lower leg.  - There are erythematous macules with overyling adherent scale on the crown of scalp and nasal bridge.   - Millia on the right nasal sidewall  - No other lesions of concern on areas examined.     Medications:  Current Outpatient Medications   Medication     Naproxen Sodium (ALEVE PO)     Omeprazole Magnesium (PRILOSEC OTC PO)     No current facility-administered medications for this visit.      Past Medical History:   Patient Active Problem List   Diagnosis     GERD (gastroesophageal reflux disease)     CARDIOVASCULAR SCREENING; LDL GOAL LESS THAN 160     Knee pain     NSAID long-term use     Left inguinal hernia     Arthritis of knee     Tobacco use disorder     No past medical history on file.     CC Referred Self, MD  No address on file on close of this encounter.

## 2021-09-14 ENCOUNTER — OFFICE VISIT (OUTPATIENT)
Dept: DERMATOLOGY | Facility: CLINIC | Age: 71
End: 2021-09-14
Payer: MEDICARE

## 2021-09-14 DIAGNOSIS — D22.9 MULTIPLE BENIGN NEVI: ICD-10-CM

## 2021-09-14 DIAGNOSIS — L57.0 ACTINIC KERATOSIS: ICD-10-CM

## 2021-09-14 DIAGNOSIS — L81.4 SOLAR LENTIGO: ICD-10-CM

## 2021-09-14 DIAGNOSIS — L57.8 SUN-DAMAGED SKIN: ICD-10-CM

## 2021-09-14 DIAGNOSIS — L72.0 MILIA: ICD-10-CM

## 2021-09-14 DIAGNOSIS — Z85.828 HISTORY OF NONMELANOMA SKIN CANCER: Primary | ICD-10-CM

## 2021-09-14 DIAGNOSIS — D18.01 CHERRY ANGIOMA: ICD-10-CM

## 2021-09-14 DIAGNOSIS — L82.1 SEBORRHEIC KERATOSIS: ICD-10-CM

## 2021-09-14 PROCEDURE — 17000 DESTRUCT PREMALG LESION: CPT | Performed by: DERMATOLOGY

## 2021-09-14 PROCEDURE — 17003 DESTRUCT PREMALG LES 2-14: CPT | Performed by: DERMATOLOGY

## 2021-09-14 PROCEDURE — 99213 OFFICE O/P EST LOW 20 MIN: CPT | Mod: 25 | Performed by: DERMATOLOGY

## 2021-09-14 NOTE — LETTER
9/14/2021         RE: Uvaldo Ascencio  [address] [place] [address]        Dear Colleague,    Thank you for referring your patient, Uvaldo Ascencio, to the [facility]. Please see a copy of my visit note below.    [facility] Dermatology Note  Encounter Date: Sep 14, 2021  Office Visit     Dermatology Problem List:  Last skin check 9/14/21, recommended yearly.  1. Hx of NMSC  - Superficial BCC, left shin, s/p biopsy 2/21/2019, efudex cream x6 weeks.   2. Actinic keratoses, diffuse  - s/p efudex 5% cream BID for 3-4 weeks to scalp/face, initiated 2/21/2019  - s/p cryotherapy  3. BLK, left supraclavicular chest, s/p biopsy 2/21/19    Social history: Patient is retired from [employer]. Patient is an avid connolly and wood worker. Spends a lot of time outside with grandchildren. Tries to wear hat when outdoors.  Family history: Negative for skin cancer.  ____________________________________________    ASSESSMENT/PLAN:    # History of NMSC. No evidence of recurrence.   - Recommend sunscreens SPF #30 or greater, protective clothing and avoidance of tanning beds.   - Recommended yearly skin exams.    # Sun damaged skin with solar lentigines. Chronic. Stable.   - Recommended sunscreens SPF #30 or greater, protective clothing and avoidance of tanning beds.     # Benign findings include: seborrheic keratoses, cherry angioma, millia. Self limited, minor.   - No further intervention required. Patient to report changes.  - Patient reassured of the benign nature of these lesions.    # Multiple clinically benign nevi. Chronic. Stable.   - No further intervention required. Patient to report changes.  - Patient reassured of the benign nature of these lesions.    # Actinic keratosis, crown of scalp and nasal bridge. Discussed precancerous nature of these lesions. Recommended treatment to prevent progression to a squamous cell carcinoma. Advised patient to call for follow up if not  resolved in 1 month.    - See cryo procedure note.    Procedures Performed:   - Cryotherapy procedure note, location(s): crown of scalp. After verbal consent and discussion of risks and benefits including, but not limited to, dyspigmentation/scar, blister, and pain, 2 lesion(s) was(were) treated with 1-2 mm freeze border for 1-2 cycles with liquid nitrogen. Post cryotherapy instructions were provided.    Follow-up: 1 year(s) in-person, or earlier for new or changing lesions    Staff and Scribe:     Scribe Disclosure:   I, Alistair lamar, am serving as a scribe to document services personally performed by this physician, Dr. Tess Chang, based on data collection and the provider's statements to me.     Provider Disclosure:   The documentation recorded by the scribe accurately reflects the services I personally performed and the decisions made by me.    Tess Chang MD    Department of Dermatology  [facility]: Phone: [phone], Fax:[phone]  [facility]: Phone: [phone], Fax: [phone]    ____________________________________________    CC: Skin Check (Full skin check. No areas or concern. Hx of NMSC)    HPI:  Mr. Uvaldo Ascencio is a(n) 70 year old male who presents today as a return patient for skin check.    Last seen 9/22/2020. At that time, cryo was performed on 3 AKs (right frontal scalp, vertex scalp, and nasal bridge).    Today patient notes no areas of concern.    Patient is otherwise feeling well, without additional concerns.    Labs:  NA    Physical exam:  Vitals: There were no vitals taken for this visit.  SKIN: Total skin excluding the undergarment areas was performed. The exam included the head/face, neck, both arms, chest, back, abdomen, both legs, digits and/or nails.   - Carlin Type II-III  - Scattered brown macules on sun exposed areas. Numerous on  the scalp.  - There are dome shaped bright red papules on the trunk.  - Multiple regular brown pigmented macules and papules are identified on the body. Less than 30 true nevi. None with significant atypia.  - There are waxy stuck on tan to brown papules on the trunk.  - Scar from previous superficial BCC is hard to find, but there is nothing concerning for a BCC on the left lower leg.  - There are erythematous macules with overyling adherent scale on the crown of scalp and nasal bridge.   - Millia on the right nasal sidewall  - No other lesions of concern on areas examined.     Medications:  Current Outpatient Medications   Medication     Naproxen Sodium (ALEVE PO)     Omeprazole Magnesium (PRILOSEC OTC PO)     No current facility-administered medications for this visit.      Past Medical History:   Patient Active Problem List   Diagnosis     GERD (gastroesophageal reflux disease)     CARDIOVASCULAR SCREENING; LDL GOAL LESS THAN 160     Knee pain     NSAID long-term use     Left inguinal hernia     Arthritis of knee     Tobacco use disorder     No past medical history on file.     CC Referred Self, MD  No address on file on close of this encounter.      Again, thank you for allowing me to participate in the care of your patient.        Sincerely,        Tess Chang MD

## 2021-09-14 NOTE — PATIENT INSTRUCTIONS
Cryotherapy    What is it?    Use of a very cold liquid, such as liquid nitrogen, to freeze and destroy abnormal skin cells that need to be removed    What should I expect?    Tenderness and redness    A small blister that might grow and fill with dark purple blood. There may be crusting.    More than one treatment may be needed if the lesions do not go away.    How do I care for the treated area?    Gently wash the area with your hands when bathing.    Use a thin layer of Vaseline to help with healing. You may use a Band-Aid.     The area should heal within 7-10 days and may leave behind a pink or lighter color.     Do not use an antibiotic or Neosporin ointment.     You may take acetaminophen (Tylenol) for pain.     Call your doctor if you have:    Severe pain    Signs of infection (warmth, redness, cloudy yellow drainage, and or a bad smell)    Questions or concerns    Who should I call with questions?       [facility]: [phone]       [facility]: [phone]       For urgent needs outside of business hours call the [facility] at [phone] and ask for the dermatology resident on call            Sun protective clothing and Resources     Augur (www.[email or web address] Technologies)  Athleta ([email or web address])  Baitianshi ([email or web address])  Carve Designs ([employer]) - affordable  Skinz ([email or web address])    Long sleeve - Shanika Cool DRI UPF 50 or [place] PFG UPF 50  Cam - [place] PFG UPF 50  Swimshirt/Rash Guard - Linnea UPF 50 (on Amazon)  Neck - Outdoor Research Ubertubes ([email or web address])

## 2021-09-14 NOTE — NURSING NOTE
Uvaldo Ascencio's goals for this visit include:   Chief Complaint   Patient presents with     Skin Check     Full skin check. No areas or concern. Hx of NMSC       He requests these members of his care team be copied on today's visit information:     PCP: Kathy Dickson    Referring Provider:  Referred Self, MD  No address on file    There were no vitals taken for this visit.    Do you need any medication refills at today's visit? Simona Diggs on 9/14/2021 at 8:08 AM

## 2021-10-02 ENCOUNTER — HEALTH MAINTENANCE LETTER (OUTPATIENT)
Age: 71
End: 2021-10-02

## 2022-03-19 ENCOUNTER — HEALTH MAINTENANCE LETTER (OUTPATIENT)
Age: 72
End: 2022-03-19

## 2022-06-15 ENCOUNTER — TELEPHONE (OUTPATIENT)
Dept: DERMATOLOGY | Facility: CLINIC | Age: 72
End: 2022-06-15
Payer: MEDICARE

## 2022-06-15 NOTE — TELEPHONE ENCOUNTER
Return in about 1 year (around 9/14/2022) for a skin check    [facility] Procedure   Dermatology, General and Plastic Surgery, Urology Specialties   [facility] [address] [address]

## 2022-06-22 ENCOUNTER — OFFICE VISIT (OUTPATIENT)
Dept: FAMILY MEDICINE | Facility: CLINIC | Age: 72
End: 2022-06-22
Payer: MEDICARE

## 2022-06-22 VITALS
HEIGHT: 66 IN | WEIGHT: 169 LBS | RESPIRATION RATE: 12 BRPM | HEART RATE: 79 BPM | BODY MASS INDEX: 27.16 KG/M2 | DIASTOLIC BLOOD PRESSURE: 82 MMHG | OXYGEN SATURATION: 97 % | SYSTOLIC BLOOD PRESSURE: 136 MMHG | TEMPERATURE: 98.6 F

## 2022-06-22 DIAGNOSIS — Z23 NEED FOR VACCINATION: ICD-10-CM

## 2022-06-22 DIAGNOSIS — Z00.00 ENCOUNTER FOR MEDICARE ANNUAL WELLNESS EXAM: Primary | ICD-10-CM

## 2022-06-22 DIAGNOSIS — Z13.220 SCREENING FOR HYPERLIPIDEMIA: ICD-10-CM

## 2022-06-22 DIAGNOSIS — N52.8 OTHER MALE ERECTILE DYSFUNCTION: ICD-10-CM

## 2022-06-22 DIAGNOSIS — R63.0 DECREASED APPETITE: ICD-10-CM

## 2022-06-22 LAB
ALBUMIN SERPL-MCNC: 3.8 G/DL (ref 3.4–5)
ALP SERPL-CCNC: 52 U/L (ref 40–150)
ALT SERPL W P-5'-P-CCNC: 21 U/L (ref 0–70)
ANION GAP SERPL CALCULATED.3IONS-SCNC: 3 MMOL/L (ref 3–14)
AST SERPL W P-5'-P-CCNC: 22 U/L (ref 0–45)
BASOPHILS # BLD AUTO: 0 10E3/UL (ref 0–0.2)
BASOPHILS NFR BLD AUTO: 0 %
BILIRUB SERPL-MCNC: 0.5 MG/DL (ref 0.2–1.3)
BUN SERPL-MCNC: 14 MG/DL (ref 7–30)
CALCIUM SERPL-MCNC: 9.3 MG/DL (ref 8.5–10.1)
CHLORIDE BLD-SCNC: 108 MMOL/L (ref 94–109)
CHOLEST SERPL-MCNC: 221 MG/DL
CO2 SERPL-SCNC: 30 MMOL/L (ref 20–32)
CREAT SERPL-MCNC: 1.03 MG/DL (ref 0.66–1.25)
EOSINOPHIL # BLD AUTO: 0.2 10E3/UL (ref 0–0.7)
EOSINOPHIL NFR BLD AUTO: 3 %
ERYTHROCYTE [DISTWIDTH] IN BLOOD BY AUTOMATED COUNT: 13.2 % (ref 10–15)
FASTING STATUS PATIENT QL REPORTED: YES
GFR SERPL CREATININE-BSD FRML MDRD: 78 ML/MIN/1.73M2
GLUCOSE BLD-MCNC: 98 MG/DL (ref 70–99)
HCT VFR BLD AUTO: 41 % (ref 40–53)
HDLC SERPL-MCNC: 76 MG/DL
HGB BLD-MCNC: 13.3 G/DL (ref 13.3–17.7)
IMM GRANULOCYTES # BLD: 0 10E3/UL
IMM GRANULOCYTES NFR BLD: 0 %
LDLC SERPL CALC-MCNC: 114 MG/DL
LYMPHOCYTES # BLD AUTO: 2.1 10E3/UL (ref 0.8–5.3)
LYMPHOCYTES NFR BLD AUTO: 31 %
MCH RBC QN AUTO: 30.5 PG (ref 26.5–33)
MCHC RBC AUTO-ENTMCNC: 32.4 G/DL (ref 31.5–36.5)
MCV RBC AUTO: 94 FL (ref 78–100)
MONOCYTES # BLD AUTO: 0.7 10E3/UL (ref 0–1.3)
MONOCYTES NFR BLD AUTO: 10 %
NEUTROPHILS # BLD AUTO: 3.8 10E3/UL (ref 1.6–8.3)
NEUTROPHILS NFR BLD AUTO: 55 %
NONHDLC SERPL-MCNC: 145 MG/DL
PLATELET # BLD AUTO: 347 10E3/UL (ref 150–450)
POTASSIUM BLD-SCNC: 4.8 MMOL/L (ref 3.4–5.3)
PROT SERPL-MCNC: 7.4 G/DL (ref 6.8–8.8)
RBC # BLD AUTO: 4.36 10E6/UL (ref 4.4–5.9)
SODIUM SERPL-SCNC: 141 MMOL/L (ref 133–144)
TRIGL SERPL-MCNC: 156 MG/DL
TSH SERPL DL<=0.005 MIU/L-ACNC: 1.06 MU/L (ref 0.4–4)
WBC # BLD AUTO: 6.8 10E3/UL (ref 4–11)

## 2022-06-22 PROCEDURE — 90677 PCV20 VACCINE IM: CPT | Performed by: FAMILY MEDICINE

## 2022-06-22 PROCEDURE — 80050 GENERAL HEALTH PANEL: CPT | Performed by: FAMILY MEDICINE

## 2022-06-22 PROCEDURE — 80061 LIPID PANEL: CPT | Performed by: FAMILY MEDICINE

## 2022-06-22 PROCEDURE — 99213 OFFICE O/P EST LOW 20 MIN: CPT | Mod: 25 | Performed by: FAMILY MEDICINE

## 2022-06-22 PROCEDURE — G0439 PPPS, SUBSEQ VISIT: HCPCS | Performed by: FAMILY MEDICINE

## 2022-06-22 PROCEDURE — 90471 IMMUNIZATION ADMIN: CPT | Performed by: FAMILY MEDICINE

## 2022-06-22 PROCEDURE — 36415 COLL VENOUS BLD VENIPUNCTURE: CPT | Performed by: FAMILY MEDICINE

## 2022-06-22 RX ORDER — SILDENAFIL 50 MG/1
50 TABLET, FILM COATED ORAL DAILY PRN
Qty: 10 TABLET | Refills: 1 | Status: SHIPPED | OUTPATIENT
Start: 2022-06-22

## 2022-06-22 ASSESSMENT — ENCOUNTER SYMPTOMS
PALPITATIONS: 0
ABDOMINAL PAIN: 0
SHORTNESS OF BREATH: 0
HEADACHES: 0
HEARTBURN: 1
COUGH: 0
HEMATOCHEZIA: 0
DIZZINESS: 0
JOINT SWELLING: 1
EYE PAIN: 0
NAUSEA: 0
SORE THROAT: 0
DYSURIA: 0
NERVOUS/ANXIOUS: 0
CONSTIPATION: 0
FREQUENCY: 0
ARTHRALGIAS: 1
DIARRHEA: 0
MYALGIAS: 0
FEVER: 0
PARESTHESIAS: 0
HEMATURIA: 0
CHILLS: 0
WEAKNESS: 0

## 2022-06-22 ASSESSMENT — ACTIVITIES OF DAILY LIVING (ADL): CURRENT_FUNCTION: NO ASSISTANCE NEEDED

## 2022-06-22 ASSESSMENT — PAIN SCALES - GENERAL: PAINLEVEL: NO PAIN (0)

## 2022-06-22 NOTE — NURSING NOTE
Prior to immunization administration, verified patients identity using patient s name and date of birth. Please see Immunization Activity for additional information.     Screening Questionnaire for Adult Immunization    Are you sick today?   No   Do you have allergies to medications, food, a vaccine component or latex?   No   Have you ever had a serious reaction after receiving a vaccination?   No   Do you have a long-term health problem with heart, lung, kidney, or metabolic disease (e.g., diabetes), asthma, a blood disorder, no spleen, complement component deficiency, a cochlear implant, or a spinal fluid leak?  Are you on long-term aspirin therapy?   No   Do you have cancer, leukemia, HIV/AIDS, or any other immune system problem?   No   Do you have a parent, brother, or sister with an immune system problem?   No   In the past 3 months, have you taken medications that affect  your immune system, such as prednisone, other steroids, or anticancer drugs; drugs for the treatment of rheumatoid arthritis, Crohn s disease, or psoriasis; or have you had radiation treatments?   No   Have you had a seizure, or a brain or other nervous system problem?   No   During the past year, have you received a transfusion of blood or blood    products, or been given immune (gamma) globulin or antiviral drug?   No   For women: Are you pregnant or is there a chance you could become       pregnant during the next month?   No   Have you received any vaccinations in the past 4 weeks?   No     Immunization questionnaire answers were all negative.      Patient instructed to remain in clinic for 15 minutes afterwards, and to report any adverse reaction to me immediately.       Screening performed by Shweta Carrasco on 6/22/2022 at 2:16 PM.

## 2022-06-22 NOTE — PROGRESS NOTES
"    The patient was counseled and encouraged to consider modifying their diet and eating habits. He was provided with information on recommended healthy diet options.  The patient was provided with written information regarding signs of hearing loss.  Answers for HPI/ROS submitted by the patient on 6/22/2022  In general, how would you rate your overall physical health?: good  Frequency of exercise:: 6-7 days/week  Do you usually eat at least 4 servings of fruit and vegetables a day, include whole grains & fiber, and avoid regularly eating high fat or \"junk\" foods? : No  Taking medications regularly:: Yes  Medication side effects:: Not applicable  Activities of Daily Living: no assistance needed  Home safety: lack of grab bars in the bathroom  Hearing Impairment:: difficulty following a conversation in a noisy restaurant or crowded room  In the past 6 months, have you been bothered by leaking of urine?: No  abdominal pain: No  Blood in stool: No  Blood in urine: No  chest pain: No  chills: No  congestion: No  constipation: No  cough: No  diarrhea: No  dizziness: No  ear pain: No  eye pain: No  nervous/anxious: No  fever: No  frequency: No  genital sores: No  headaches: No  hearing loss: No  heartburn: Yes  arthralgias: Yes  joint swelling: Yes  peripheral edema: No  mood changes: No  myalgias: No  nausea: No  dysuria: No  palpitations: No  Skin sensation changes: No  sore throat: No  urgency: No  rash: No  shortness of breath: No  visual disturbance: No  weakness: No  impotence: Yes  penile discharge: No  In general, how would you rate your overall mental or emotional health?: excellent  Additional concerns today:: No  Duration of exercise:: Greater than 60 minutes      "

## 2022-06-22 NOTE — PROGRESS NOTES
"SUBJECTIVE:   Uvaldo Ascencio is a 71 year old male who presents for Preventive Visit.      Patient has been advised of split billing requirements and indicates understanding: Yes  Are you in the first 12 months of your Medicare coverage?  No    Healthy Habits:     In general, how would you rate your overall health?  Good    Frequency of exercise:  6-7 days/week    Duration of exercise:  Greater than 60 minutes    Do you usually eat at least 4 servings of fruit and vegetables a day, include whole grains    & fiber and avoid regularly eating high fat or \"junk\" foods?  No    Taking medications regularly:  Yes    Medication side effects:  Not applicable    Ability to successfully perform activities of daily living:  No assistance needed    Home Safety:  Lack of grab bars in the bathroom    Hearing Impairment:  Difficulty following a conversation in a noisy restaurant or crowded room    In the past 6 months, have you been bothered by leaking of urine?  No    In general, how would you rate your overall mental or emotional health?  Excellent      PHQ-2 Total Score: 0    Additional concerns today:  No    ERECTILE DYSFUNCTION - has been a problem for years.  Patient reports he and his wife would like this addressed now.  He would like to try Viagra to see if that is helpful for him.  He has not tried any medications in the past.    DECREASED APPETITE - for the last few months. Rare nausea. No abdominal pain.  He thinks he has lost weight but is happy with that as he gained weight during the wintertime.  He can eat and does not have any problems eating just does not always feel that he has an appetite.  He has no change in bowels,  vomiting, or other concerns.      Do you feel safe in your environment? Yes    Have you ever done Advance Care Planning? (For example, a Health Directive, POLST, or a discussion with a medical provider or your loved ones about your wishes): Yes, patient states has an Advance Care Planning document " and will bring a copy to the clinic.       Fall risk  Fallen 2 or more times in the past year?: No  Any fall with injury in the past year?: No    Cognitive Screening   1) Repeat 3 items (Leader, Season, Table)    2) Clock draw: NORMAL  3) 3 item recall: Recalls 3 objects  Results: 3 items recalled: COGNITIVE IMPAIRMENT LESS LIKELY    Mini-CogTM Copyright JOY Velásquez. Licensed by the author for use in [facility]; reprinted with permission (soob@[facility]). All rights reserved.      Do you have sleep apnea, excessive snoring or daytime drowsiness?: no    Reviewed and updated as needed this visit by clinical staff   Tobacco  Allergies  Meds  Problems  Med Hx  Surg Hx  Fam Hx  Soc   Hx          Reviewed and updated as needed this visit by Provider    Allergies  Meds  Problems              Social History     Tobacco Use     Smoking status: Former Smoker     Types: Cigars     Smokeless tobacco: Never Used   Substance Use Topics     Alcohol use: Yes     Comment: 0-2 per week          Alcohol Use 6/22/2022   Prescreen: >3 drinks/day or >7 drinks/week? No   Prescreen: >3 drinks/day or >7 drinks/week? -               Current providers sharing in care for this patient include:   Patient Care Team:  Kathy Dickson MD as PCP - General (Family Practice)  Kathy Dickson MD as Assigned PCP  Tess Chang MD as Assigned Surgical Provider    The following health maintenance items are reviewed in Epic and correct as of today:  Health Maintenance Due   Topic Date Due     ZOSTER IMMUNIZATION (1 of 2) Never done     LUNG CANCER SCREENING  Never done     AORTIC ANEURYSM SCREENING (SYSTEM ASSIGNED)  11/25/2016     DTAP/TDAP/TD IMMUNIZATION (2 - Td or Tdap) 04/05/2021     LIPID  05/04/2021     COVID-19 Vaccine (4 - Booster for Pfizer series) 02/23/2022     BP Readings from Last 3 Encounters:   06/22/22 136/82   02/18/20 110/86   02/20/19 138/80    Wt Readings from Last 3 Encounters:   06/22/22 76.7 kg (169 lb)  "  02/18/20 75.8 kg (167 lb)   02/20/19 73.9 kg (163 lb)                          Review of Systems   Constitutional: Negative for chills and fever.   HENT: Negative for congestion, ear pain, hearing loss and sore throat.    Eyes: Negative for pain and visual disturbance.   Respiratory: Negative for cough and shortness of breath.    Cardiovascular: Negative for chest pain, palpitations and peripheral edema.   Gastrointestinal: Positive for heartburn. Negative for abdominal pain, constipation, diarrhea, hematochezia and nausea.   Genitourinary: Positive for impotence. Negative for dysuria, frequency, genital sores, hematuria, penile discharge and urgency.   Musculoskeletal: Positive for arthralgias and joint swelling. Negative for myalgias.   Skin: Negative for rash.   Neurological: Negative for dizziness, weakness, headaches and paresthesias.   Psychiatric/Behavioral: Negative for mood changes. The patient is not nervous/anxious.          OBJECTIVE:   /82   Pulse 79   Temp 98.6  F (37  C) (Temporal)   Resp 12   Ht 1.68 m (5' 6.14\")   Wt 76.7 kg (169 lb)   SpO2 97%   BMI 27.16 kg/m   Estimated body mass index is 27.16 kg/m  as calculated from the following:    Height as of this encounter: 1.68 m (5' 6.14\").    Weight as of this encounter: 76.7 kg (169 lb).  Physical Exam  GENERAL: healthy, alert and no distress  EYES: Eyes grossly normal to inspection, PERRL and conjunctivae and sclerae normal  HENT: ear canals and TM's normal, nose and mouth without ulcers or lesions  NECK: no adenopathy, no asymmetry, masses, or scars and thyroid normal to palpation  RESP: lungs clear to auscultation - no rales, rhonchi or wheezes  CV: regular rate and rhythm, normal S1 S2, no S3 or S4, no murmur, click or rub, no peripheral edema and peripheral pulses strong  ABDOMEN: soft, nontender, no hepatosplenomegaly, no masses and bowel sounds normal  MS: no gross musculoskeletal defects noted, no edema  SKIN: no suspicious " lesions or rashes  NEURO: Normal strength and tone, mentation intact and speech normal  PSYCH: mentation appears normal, affect normal/bright    Diagnostic Test Results:  Labs reviewed in Epic    ASSESSMENT / PLAN:   (Z00.00) Encounter for Medicare annual wellness exam  (primary encounter diagnosis)  Comment: See counseling messages  Plan: Recheck in 1 year    (N52.8) Other male erectile dysfunction  Comment: Patient with longstanding erectile dysfunction.  He would like to trial medication.  Discussed with patient options and will proceed with Viagra.  Plan: sildenafil (VIAGRA) 50 MG tablet        Prescriptions for Viagra.  Discussed use.  Recheck if not receiving good results from the medication.    (R63.0) Decreased appetite  Comment: Patient has noticed decreased appetite overall.  He otherwise feels fine.  No other symptoms noted on questioning and no changes on exam noted.  Will evaluate further to evaluate for anemia, change in white count, liver and kidney function, and check thyroid.  Plan: Comprehensive metabolic panel (BMP + Alb, Alk         Phos, ALT, AST, Total. Bili, TP), TSH with free        T4 reflex, CBC with platelets and differential        Will notify of results.    (Z13.220) Screening for hyperlipidemia  Comment: Fasting  Plan: Lipid panel reflex to direct LDL Fasting        Will notify results    (Z23) Need for vaccination  Comment: Discussed  Plan: Pneumococcal 20 Valent Conjugate (PCV20)        Patient will proceed with the PCV 20 today.  He will inquire about the Shingrix and Tdap at the pharmacy due to Medicare's coverage.  He declines COVID booster today.      Patient has been advised of split billing requirements and indicates understanding: Yes    COUNSELING:  Reviewed preventive health counseling, as reflected in patient instructions       Regular exercise       Healthy diet/nutrition    Estimated body mass index is 27.16 kg/m  as calculated from the following:    Height as of this  "encounter: 1.68 m (5' 6.14\").    Weight as of this encounter: 76.7 kg (169 lb).        He reports that he has quit smoking. His smoking use included cigars. He has never used smokeless tobacco.      Appropriate preventive services were discussed with this patient, including applicable screening as appropriate for cardiovascular disease, diabetes, osteopenia/osteoporosis, and glaucoma.  As appropriate for age/gender, discussed screening for colorectal cancer, prostate cancer, breast cancer, and cervical cancer. Checklist reviewing preventive services available has been given to the patient.    Reviewed patients plan of care and provided an AVS. The Basic Care Plan (routine screening as documented in Health Maintenance) for Uvaldo meets the Care Plan requirement. This Care Plan has been established and reviewed with the Patient.    Counseling Resources:  ATP IV Guidelines  Pooled Cohorts Equation Calculator  Breast Cancer Risk Calculator  Breast Cancer: Medication to Reduce Risk  FRAX Risk Assessment  ICSI Preventive Guidelines  Dietary Guidelines for Americans, 2010  [employer]'s MyPlate  ASA Prophylaxis  Lung CA Screening    Kathy Dickson MD  [facility]    Identified Health Risks:  "

## 2022-06-22 NOTE — PATIENT INSTRUCTIONS
Recommend tetanus update. This is covered better at the pharmacy.     Patient Education   Personalized Prevention Plan  You are due for the preventive services outlined below.  Your care team is available to assist you in scheduling these services.  If you have already completed any of these items, please share that information with your care team to update in your medical record.  Health Maintenance Due   Topic Date Due    ANNUAL REVIEW OF HM ORDERS  Never done    Discuss Advance Care Planning  Never done    Zoster (Shingles) Vaccine (1 of 2) Never done    LUNG CANCER SCREENING  Never done    Pneumococcal Vaccine (1 - PCV) Never done    AORTIC ANEURYSM SCREENING (SYSTEM ASSIGNED)  11/25/2016    Annual Wellness Visit  02/20/2020    Diptheria Tetanus Pertussis (DTAP/TDAP/TD) Vaccine (2 - Td or Tdap) 04/05/2021    Cholesterol Lab  05/04/2021    COVID-19 Vaccine (4 - Booster for Pfizer series) 02/23/2022        Patient Education   Personalized Prevention Plan  You are due for the preventive services outlined below.  Your care team is available to assist you in scheduling these services.  If you have already completed any of these items, please share that information with your care team to update in your medical record.  Health Maintenance Due   Topic Date Due    ANNUAL REVIEW OF HM ORDERS  Never done    Zoster (Shingles) Vaccine (1 of 2) Never done    LUNG CANCER SCREENING  Never done    Pneumococcal Vaccine (1 - PCV) Never done    AORTIC ANEURYSM SCREENING (SYSTEM ASSIGNED)  11/25/2016    Diptheria Tetanus Pertussis (DTAP/TDAP/TD) Vaccine (2 - Td or Tdap) 04/05/2021    Cholesterol Lab  05/04/2021    COVID-19 Vaccine (4 - Booster for Pfizer series) 02/23/2022       Understanding USDA MyPlate  The USDA has guidelines to help you make healthy food choices. These are called MyPlate. MyPlate shows the food groups that make up healthy meals using the image of a place setting. Before you eat, think about the healthiest  choices for what to put on your plate or in your cup or bowl. To learn more about building a healthy plate, visit www.choosemyplate.gov.    The food groups  Fruits. Any fruit or 100% fruit juice counts as part of the Fruit Group. Fruits may be fresh, canned, frozen, or dried, and may be whole, cut-up, or pureed. Make 1/2 of your plate fruits and vegetables.  Vegetables. Any vegetable or 100% vegetable juice counts as a member of the Vegetable Group. Vegetables may be fresh, frozen, canned, or dried. They can be served raw or cooked and may be whole, cut-up, or mashed. Make 1/2 of your plate fruits and vegetables.  Grains. All foods made from grains are part of the Grains Group. These include wheat, rice, oats, cornmeal, and barley. Grains are often used to make foods such as bread, pasta, oatmeal, cereal, tortillas, and grits. Grains should be no more than 1/4 of your plate. At least half of your grains should be whole grains.  Protein. This group includes meat, poultry, seafood, beans and peas, eggs, processed soy products (such as tofu), nuts (including nut butters), and seeds. Make protein choices no more than 1/4 of your plate. Meat and poultry choices should be lean or low fat.  Dairy. The Dairy Group includes all fluid milk products and foods made from milk that contain calcium, such as yogurt and cheese. (Foods that have little calcium, such as cream, butter, and cream cheese, are not part of this group.) Most dairy choices should be low-fat or fat-free.  Oils. Oils aren't a food group, but they do contain essential nutrients. However it's important to watch your intake of oils. These are fats that are liquid at room temperature. They include canola, corn, olive, soybean, vegetable, and sunflower oil. Foods that are mainly oil include mayonnaise, certain salad dressings, and soft margarines. You likely already get your daily oil allowance from the foods you eat.  Things to limit  Eating healthy also means  limiting these things in your diet:     Salt (sodium). Many processed foods have a lot of sodium. To keep sodium intake down, eat fresh vegetables, meats, poultry, and seafood when possible. Purchase low-sodium, reduced-sodium, or no-salt-added food products at the store. And don't add salt to your meals at home. Instead, season them with herbs and spices such as dill, oregano, cumin, and paprika. Or try adding flavor with lemon or lime zest and juice.  Saturated fat. Saturated fats are most often found in animal products such as beef, pork, and chicken. They are often solid at room temperature, such as butter. To reduce your saturated fat intake, choose leaner cuts of meat and poultry. And try healthier cooking methods such as grilling, broiling, roasting, or baking. For a simple lower-fat swap, use plain nonfat yogurt instead of mayonnaise when making potato salad or macaroni salad.  Added sugars. These are sugars added to foods. They are in foods such as ice cream, candy, soda, fruit drinks, sports drinks, energy drinks, cookies, pastries, jams, and syrups. Cut down on added sugars by sharing sweet treats with a family member or friend. You can also choose fruit for dessert, and drink water or other unsweetened beverages.     [employer] last reviewed this educational content on 6/1/2020 2000-2021 [employer]. All rights reserved. This information is not intended as a substitute for professional medical care. Always follow your healthcare professional's instructions.          Signs of Hearing Loss      Hearing much better with one ear can be a sign of hearing loss.   Hearing loss is a problem shared by many people. In fact, it is one of the most common health problems, particularly as people age. Most people age 65 and older have some hearing loss. By age 80, almost everyone does. Hearing loss often occurs slowly over the years. So you may not realize your hearing has gotten worse.  Have your  hearing checked  Call your healthcare provider if you:  Have to strain to hear normal conversation  Have to watch other people s faces very carefully to follow what they re saying  Need to ask people to repeat what they ve said  Often misunderstand what people are saying  Turn the volume of the television or radio up so high that others complain  Feel that people are mumbling when they re talking to you  Find that the effort to hear leaves you feeling tired and irritated  Notice, when using the phone, that you hear better with one ear than the other  [employer] last reviewed this educational content on 1/1/2020 2000-2021 [employer]. All rights reserved. This information is not intended as a substitute for professional medical care. Always follow your healthcare professional's instructions.

## 2022-06-23 DIAGNOSIS — E78.2 MIXED HYPERLIPIDEMIA: Primary | ICD-10-CM

## 2022-06-23 RX ORDER — ATORVASTATIN CALCIUM 10 MG/1
10 TABLET, FILM COATED ORAL DAILY
Qty: 90 TABLET | Refills: 0 | Status: SHIPPED | OUTPATIENT
Start: 2022-06-23 | End: 2022-09-22

## 2022-09-03 ENCOUNTER — HEALTH MAINTENANCE LETTER (OUTPATIENT)
Age: 72
End: 2022-09-03

## 2023-05-05 ENCOUNTER — HOSPITAL ENCOUNTER (EMERGENCY)
Facility: CLINIC | Age: 73
Discharge: HOME | End: 2023-05-05
Attending: STUDENT IN AN ORGANIZED HEALTH CARE EDUCATION/TRAINING PROGRAM | Admitting: STUDENT IN AN ORGANIZED HEALTH CARE EDUCATION/TRAINING PROGRAM
Payer: MEDICARE

## 2023-05-05 ENCOUNTER — ALLIED HEALTH/NURSE VISIT (OUTPATIENT)
Dept: FAMILY MEDICINE | Facility: CLINIC | Age: 73
End: 2023-05-05
Payer: MEDICARE

## 2023-05-05 ENCOUNTER — APPOINTMENT (OUTPATIENT)
Dept: GENERAL RADIOLOGY | Facility: CLINIC | Age: 73
End: 2023-05-05
Attending: STUDENT IN AN ORGANIZED HEALTH CARE EDUCATION/TRAINING PROGRAM
Payer: MEDICARE

## 2023-05-05 ENCOUNTER — OFFICE VISIT (OUTPATIENT)
Dept: FAMILY MEDICINE | Facility: CLINIC | Age: 73
End: 2023-05-05
Payer: MEDICARE

## 2023-05-05 VITALS
DIASTOLIC BLOOD PRESSURE: 84 MMHG | RESPIRATION RATE: 15 BRPM | TEMPERATURE: 98.4 F | SYSTOLIC BLOOD PRESSURE: 165 MMHG | OXYGEN SATURATION: 99 % | HEART RATE: 62 BPM

## 2023-05-05 VITALS
TEMPERATURE: 98 F | RESPIRATION RATE: 18 BRPM | SYSTOLIC BLOOD PRESSURE: 168 MMHG | HEART RATE: 87 BPM | DIASTOLIC BLOOD PRESSURE: 84 MMHG | OXYGEN SATURATION: 96 %

## 2023-05-05 DIAGNOSIS — R55 SYNCOPE, UNSPECIFIED SYNCOPE TYPE: Primary | ICD-10-CM

## 2023-05-05 DIAGNOSIS — Z78.9 TRIAGE ASSESSMENT CLASS 2, URGENT: Primary | ICD-10-CM

## 2023-05-05 DIAGNOSIS — R03.0 ELEVATED BLOOD PRESSURE READING WITHOUT DIAGNOSIS OF HYPERTENSION: ICD-10-CM

## 2023-05-05 DIAGNOSIS — R94.31 ABNORMAL ELECTROCARDIOGRAM: ICD-10-CM

## 2023-05-05 DIAGNOSIS — R55 SYNCOPE: Primary | ICD-10-CM

## 2023-05-05 LAB
ALBUMIN SERPL BCG-MCNC: 4 G/DL (ref 3.5–5.2)
ALP SERPL-CCNC: 56 U/L (ref 40–129)
ALT SERPL W P-5'-P-CCNC: 13 U/L (ref 10–50)
ANION GAP SERPL CALCULATED.3IONS-SCNC: 10 MMOL/L (ref 7–15)
AST SERPL W P-5'-P-CCNC: 30 U/L (ref 10–50)
ATRIAL RATE - MUSE: 65 BPM
BASOPHILS # BLD AUTO: 0 10E3/UL (ref 0–0.2)
BASOPHILS NFR BLD AUTO: 0 %
BILIRUB SERPL-MCNC: 0.6 MG/DL
BUN SERPL-MCNC: 21.2 MG/DL (ref 8–23)
CALCIUM SERPL-MCNC: 9.2 MG/DL (ref 8.8–10.2)
CHLORIDE SERPL-SCNC: 104 MMOL/L (ref 98–107)
CREAT SERPL-MCNC: 1.07 MG/DL (ref 0.67–1.17)
DEPRECATED HCO3 PLAS-SCNC: 24 MMOL/L (ref 22–29)
DIASTOLIC BLOOD PRESSURE - MUSE: NORMAL MMHG
EOSINOPHIL # BLD AUTO: 0.2 10E3/UL (ref 0–0.7)
EOSINOPHIL NFR BLD AUTO: 2 %
ERYTHROCYTE [DISTWIDTH] IN BLOOD BY AUTOMATED COUNT: 13.9 % (ref 10–15)
GFR SERPL CREATININE-BSD FRML MDRD: 74 ML/MIN/1.73M2
GLUCOSE BLD-MCNC: 157 MG/DL (ref 60–99)
GLUCOSE SERPL-MCNC: 73 MG/DL (ref 70–99)
HCT VFR BLD AUTO: 36 % (ref 40–53)
HGB BLD-MCNC: 11.9 G/DL (ref 13.3–17.7)
HOLD SPECIMEN: NORMAL
HOLD SPECIMEN: NORMAL
IMM GRANULOCYTES # BLD: 0 10E3/UL
IMM GRANULOCYTES NFR BLD: 0 %
INTERPRETATION ECG - MUSE: NORMAL
LYMPHOCYTES # BLD AUTO: 1.6 10E3/UL (ref 0.8–5.3)
LYMPHOCYTES NFR BLD AUTO: 17 %
MAGNESIUM SERPL-MCNC: 1.7 MG/DL (ref 1.7–2.3)
MCH RBC QN AUTO: 31 PG (ref 26.5–33)
MCHC RBC AUTO-ENTMCNC: 33.1 G/DL (ref 31.5–36.5)
MCV RBC AUTO: 94 FL (ref 78–100)
MONOCYTES # BLD AUTO: 0.7 10E3/UL (ref 0–1.3)
MONOCYTES NFR BLD AUTO: 8 %
NEUTROPHILS # BLD AUTO: 6.5 10E3/UL (ref 1.6–8.3)
NEUTROPHILS NFR BLD AUTO: 73 %
NRBC # BLD AUTO: 0 10E3/UL
NRBC BLD AUTO-RTO: 0 /100
P AXIS - MUSE: 32 DEGREES
PLATELET # BLD AUTO: 339 10E3/UL (ref 150–450)
POTASSIUM SERPL-SCNC: 4.4 MMOL/L (ref 3.4–5.3)
PR INTERVAL - MUSE: 136 MS
PROT SERPL-MCNC: 6.7 G/DL (ref 6.4–8.3)
QRS DURATION - MUSE: 150 MS
QT - MUSE: 448 MS
QTC - MUSE: 465 MS
R AXIS - MUSE: -34 DEGREES
RBC # BLD AUTO: 3.84 10E6/UL (ref 4.4–5.9)
SODIUM SERPL-SCNC: 138 MMOL/L (ref 136–145)
SYSTOLIC BLOOD PRESSURE - MUSE: NORMAL MMHG
T AXIS - MUSE: 1 DEGREES
TROPONIN T SERPL HS-MCNC: 18 NG/L
TROPONIN T SERPL HS-MCNC: 20 NG/L
VENTRICULAR RATE- MUSE: 65 BPM
WBC # BLD AUTO: 9 10E3/UL (ref 4–11)

## 2023-05-05 PROCEDURE — 80053 COMPREHEN METABOLIC PANEL: CPT | Performed by: FAMILY MEDICINE

## 2023-05-05 PROCEDURE — 99215 OFFICE O/P EST HI 40 MIN: CPT | Performed by: FAMILY MEDICINE

## 2023-05-05 PROCEDURE — 71046 X-RAY EXAM CHEST 2 VIEWS: CPT

## 2023-05-05 PROCEDURE — 93005 ELECTROCARDIOGRAM TRACING: CPT

## 2023-05-05 PROCEDURE — 99207 PR NO CHARGE NURSE ONLY: CPT

## 2023-05-05 PROCEDURE — 96360 HYDRATION IV INFUSION INIT: CPT

## 2023-05-05 PROCEDURE — 85025 COMPLETE CBC W/AUTO DIFF WBC: CPT | Performed by: STUDENT IN AN ORGANIZED HEALTH CARE EDUCATION/TRAINING PROGRAM

## 2023-05-05 PROCEDURE — 36415 COLL VENOUS BLD VENIPUNCTURE: CPT | Performed by: EMERGENCY MEDICINE

## 2023-05-05 PROCEDURE — 99285 EMERGENCY DEPT VISIT HI MDM: CPT | Mod: 25

## 2023-05-05 PROCEDURE — 85025 COMPLETE CBC W/AUTO DIFF WBC: CPT | Performed by: EMERGENCY MEDICINE

## 2023-05-05 PROCEDURE — 258N000003 HC RX IP 258 OP 636: Performed by: STUDENT IN AN ORGANIZED HEALTH CARE EDUCATION/TRAINING PROGRAM

## 2023-05-05 PROCEDURE — 36416 COLLJ CAPILLARY BLOOD SPEC: CPT | Performed by: FAMILY MEDICINE

## 2023-05-05 PROCEDURE — 93000 ELECTROCARDIOGRAM COMPLETE: CPT | Performed by: FAMILY MEDICINE

## 2023-05-05 PROCEDURE — 83735 ASSAY OF MAGNESIUM: CPT | Performed by: STUDENT IN AN ORGANIZED HEALTH CARE EDUCATION/TRAINING PROGRAM

## 2023-05-05 PROCEDURE — 80053 COMPREHEN METABOLIC PANEL: CPT | Performed by: STUDENT IN AN ORGANIZED HEALTH CARE EDUCATION/TRAINING PROGRAM

## 2023-05-05 PROCEDURE — 84484 ASSAY OF TROPONIN QUANT: CPT | Performed by: STUDENT IN AN ORGANIZED HEALTH CARE EDUCATION/TRAINING PROGRAM

## 2023-05-05 PROCEDURE — 84484 ASSAY OF TROPONIN QUANT: CPT | Mod: 91 | Performed by: EMERGENCY MEDICINE

## 2023-05-05 RX ORDER — ASPIRIN 81 MG/1
162 TABLET, CHEWABLE ORAL ONCE
Status: COMPLETED | OUTPATIENT
Start: 2023-05-05 | End: 2023-05-05

## 2023-05-05 RX ADMIN — ASPIRIN 162 MG: 81 TABLET, CHEWABLE ORAL at 14:30

## 2023-05-05 RX ADMIN — SODIUM CHLORIDE 500 ML: 9 INJECTION, SOLUTION INTRAVENOUS at 17:44

## 2023-05-05 ASSESSMENT — ACTIVITIES OF DAILY LIVING (ADL)
ADLS_ACUITY_SCORE: 35
ADLS_ACUITY_SCORE: 35

## 2023-05-05 ASSESSMENT — PAIN SCALES - GENERAL: PAINLEVEL: NO PAIN (0)

## 2023-05-05 NOTE — ED NOTES
Bed: ED03  Expected date: 5/5/23  Expected time: 3:58 PM  Means of arrival: Ambulance  Comments:  720 71m synocpe, EKG changes ETA now

## 2023-05-05 NOTE — PROGRESS NOTES
Assessment & Plan   1. Syncope, unspecified syncope type: Patient's serum blood sugar was 157 upon arrival.  Physical exam was unrevealing.  Brief episode of loss of consciousness preceded by physical exertion.  No active chest pains or shortness of breath.  Unfortunately EKG showed new right bundle branch block and left anterior fascicular block of unknown duration.  Previously was normal when checked on February 18, 2020.  Given these new EKG changes along with syncope earlier today I did recommend evaluation in an ER setting to rule out cardiogenic causes.  2 baby aspirin were given to the patient.  Vitals were stable.  EMS was contacted and patient left vitally stable and taken to [facility].  I called the ER prior to patient departure to sign out to the ER provider.  - Glucose whole blood; Future  - Glucose whole blood    2. Abnormal electrocardiogram: Plan as above.      Gato Hernandez MD  [facility]    Disclaimer: This note consists of symbols derived from keyboarding, dictation and/or voice recognition software. As a result, there may be errors in the script that have gone undetected. Please consider this when interpreting information found in this chart.    Total time spent today for this visit is 53 minutes including  patient interview, exam, review of labs/ekg, developing plan together with the patient/family, coordinating care/contacting ER/EMS, and medical documentation.      Ivonne Bailon is a 72 year old, presenting for the following health issues:         View : No data to display.              HPI     Patient is here today as an emergent walk-in.  Approximately 20 minutes prior to arrival patient was playing pickle ball and collapse for approximate 20 seconds.  He had eaten a peanut butter and jelly prior to playing.  This was a witnessed event by his wife.  He did feel lightheaded prior to this occurring.  He denies any aura, headache.  He did  feel significantly fatigued after waking.  He denies any biting of his tongue, loss of bladder or bowel function.  His wife states she may have noticed some tonic-clonic movements.    Patient currently denies any chest pain, shortness of breath, headache, extremity weakness, numbness, tingling, vision changes, hearing changes.    He has no personal history of spells like this in the past.    Please see nursing note on the same date for further details.    Review of Systems   Constitutional, HEENT, cardiovascular, pulmonary, GI, , musculoskeletal, neuro, skin, endocrine and psych systems are negative, except as otherwise noted.      Objective      Physical Exam   General: Appears well and in no acute distress.  Cardiovascular: Regular rate and rhythm, normal S1 and S2 without murmur. No extra heartsounds or friction rub.  Respiratory: Lungs clear to auscultation bilaterally. No wheezing or crackles.  Musculoskeletal: No gross extremity deformities. No peripheral edema. Normal muscle bulk.  Neuro: Cranial nerves II through 12 intact.  Able to perform immediate and delayed recall of 3 items.  Able to perform tongue twisters.  Able to perform serial sevens.  Clear coherent speech. Good  strength. 5/5 strength of wrist, elbow, shoulder, hip, knee, and ankle flexion and extension. 5/5 strength of shoulder and hip adduction and abduction. Light touch sensation of upper and lower extremities intact. Negative Romberg, no pronator drift. No difficulty rapid alternating movements or finger to nose test. Unassisted gait stable. Biceps, triceps, patellar , and ankle reflexes are equal bilaterally and without hyperreflexia. No ankle clonus.    Labs:    POC Glucose - 157    EKG: Normal sinus rhythm with rate ~79 bpm. New onset left anterior fascicular block and right bundle branch block new compared to EKG dated 2/18/2020.

## 2023-05-05 NOTE — PROGRESS NOTES
"Patient presented to clinic after \"passing out playing pickle ball.\" Patient reports, \"I felt light headed and knew I wasn't going to be standing much longer and I made my way to the ground.\" Per wife he did not hit is head but was passed out for about 10-20 seconds and had \"seizure like shaking\"  Wife brought patient to clinic for assessment.    Patient denies a history of blood clots, cardiac problems or hypertension.     Patient denies chest pain, SOB, headache, arm pain, clamminess/sweating. No nausea or vomiting.     Patient reported his head felt a little fuzzy upon arrival but it is clearing now.    Patient endorses fatigue and tiredness.  Patient is A&Ox4 & PERRLA      BP (!) 168/84 (BP Location: Left arm, Patient Position: Sitting, Cuff Size: Adult Regular)   Pulse 87   Temp 98  F (36.7  C)   Resp 18   SpO2 96%     Patient reports he woke up at 5am and had only coffee, a glass of OJ, and a PB&J sandwhich prior to playing pickleball. Patient reports no water this morning and only a small amount after collapsing prior to arriving at the clinic.     Upon assessment of patient provider ordered:  POCT Blood Glucose- Value resulted -157 mg/dL  EKG- left anterior fascicular block and rbbb -not present on most recent EKG in patient chart from 2/18/2020  CBC- not completed prior to EMS arrival  BMP- not completed prior to EMS arrival    EKG and POCT Blood Glucose completed. Provider evaluated EKG. Due to patient syncope episode, elevated blood pressures and new left anterior fascicular block and rbbb (not present on EKG from 2/18/2020) advised patient to present to ER for further assessment via EMS for safety of patient. Patient verbalized understanding and all questions answered. Provider recommended a hospital with cardiac resources in the event interventions are needed. CBC and BMP were not completed prior to EMS arrival.     81mg chewable aspirin x2 given at 1430.     Patient left clinic via EMS to [facility]. Wife to follow in personal vehicle.     LUCA RolandN, RN  [facility] ~ Registered Nurse  Clinic Triage ~ [place] [facility] [place]  May 5, 2023

## 2023-05-05 NOTE — ED PROVIDER NOTES
History     Chief Complaint:  Syncope       HPI   Uvaldo Ascencio is a 72 year old male with a history of hyperlipidemia, noncompliant with his statin who presents with syncopal versus seizure episode today.  Patient states he was playing pickle ball earlier today when his vision got blurry, he held his hand up to stop the game, and he lowered himself to the ground slowly.  Wife was there and witnessed event.  Patient and wife state that he was down for roughly 10 seconds.  Did not hit his head.  Wife states that he was spastic during event and that his eyes were closed.  She thought he appeared to be about to vomit during event and rolled patient to the side.  Patient denies any confusion after event and wife states he was behaving normally without increased lethargy, confusion, or behavioral changes. Denies any chest pain, shortness of breath, headache, ongoing vision changes, weakness, sensory changes, abdominal pain, nausea, vomiting.  States that he has not been taking his statin for his cholesterol.  Denies history of clots.  Denies history of syncopal events or seizures.      Independent Historian:   Spouse/Partner - They report above    Review of External Notes: Reviewed progress note from earlier today, thought to have new right bundle branch block    Allergies:  No Known Drug Allergy     Medications:    atorvastatin (LIPITOR) 10 MG tablet  Omeprazole Magnesium (PRILOSEC OTC PO)  sildenafil (VIAGRA) 50 MG tablet        Past Medical History:    No past medical history on file.    Past Surgical History:    Past Surgical History:   Procedure Laterality Date     APPENDECTOMY      as a teen     ARTHROPLASTY KNEE UNICOMPARTMENT  01/27/12    [facility]     COLONOSCOPY WITH CO2 INSUFFLATION N/A 7/15/2016    Procedure: COLONOSCOPY WITH CO2 INSUFFLATION;  Surgeon: Kathy Dickson MD;  Location: [facility] OR     HERNIA REPAIR Right 2003    right sided     HERNIA REPAIR Left         Family History:    family  history includes Breast Cancer in his sister; Heart Disease in his father.    Social History:   reports that he has quit smoking. His smoking use included cigars. He has never used smokeless tobacco. He reports current alcohol use. He reports that he does not use drugs.  PCP: Kathy Dickson     Physical Exam     Patient Vitals for the past 24 hrs:   BP Temp Temp src Pulse Resp SpO2   05/05/23 1832 (!) 165/84 -- -- 62 15 99 %   05/05/23 1713 (!) 162/86 -- -- 64 22 100 %   05/05/23 1700 (!) 166/89 -- -- 62 13 98 %   05/05/23 1645 (!) 161/88 -- -- 64 17 98 %   05/05/23 1632 (!) 161/87 -- -- 66 13 99 %   05/05/23 1615 (!) 177/88 -- -- 69 12 100 %   05/05/23 1610 (!) 194/112 98.4  F (36.9  C) Oral 66 20 99 %        Physical Exam  General: Alert and cooperative with exam. Patient in no apparent distress. Normal mentation.  Head:  Scalp is NC/AT  Eyes:  No scleral icterus, PERRL  ENT:  The external nose and ears are normal.   Neck:  Normal range of motion without rigidity.  CV:  Regular rate and rhythm    No pathologic murmur   Resp:  Breath sounds are clear bilaterally    Non-labored, no retractions or accessory muscle use  GI:  Abdomen is soft, no distension, no tenderness. No peritoneal signs  MS:  No lower extremity edema   Skin:  Warm and dry, No rash or lesions noted.  Neuro:  Oriented x 3. No gross motor deficits.      Emergency Department Course   Imaging:  XR Chest 2 Views   Final Result   IMPRESSION: Lungs are clear. No pleural effusions or pneumothorax. Pulmonary vascularity is within normal limits. Nonenlarged cardiac silhouette. Mildly tortuous thoracic aorta.        Report per radiology      Laboratory:  Labs Ordered and Resulted from Time of ED Arrival to Time of ED Departure   CBC WITH PLATELETS AND DIFFERENTIAL - Abnormal       Result Value    WBC Count 9.0      RBC Count 3.84 (*)     Hemoglobin 11.9 (*)     Hematocrit 36.0 (*)     MCV 94      MCH 31.0      MCHC 33.1      RDW 13.9      Platelet Count  339      % Neutrophils 73      % Lymphocytes 17      % Monocytes 8      % Eosinophils 2      % Basophils 0      % Immature Granulocytes 0      NRBCs per 100 WBC 0      Absolute Neutrophils 6.5      Absolute Lymphocytes 1.6      Absolute Monocytes 0.7      Absolute Eosinophils 0.2      Absolute Basophils 0.0      Absolute Immature Granulocytes 0.0      Absolute NRBCs 0.0     COMPREHENSIVE METABOLIC PANEL - Normal    Sodium 138      Potassium 4.4      Chloride 104      Carbon Dioxide (CO2) 24      Anion Gap 10      Urea Nitrogen 21.2      Creatinine 1.07      Calcium 9.2      Glucose 73      Alkaline Phosphatase 56      AST 30      ALT 13      Protein Total 6.7      Albumin 4.0      Bilirubin Total 0.6      GFR Estimate 74     TROPONIN T, HIGH SENSITIVITY - Normal    Troponin T, High Sensitivity 20     MAGNESIUM - Normal    Magnesium 1.7     TROPONIN T, HIGH SENSITIVITY - Normal    Troponin T, High Sensitivity 18          Procedures   None    Emergency Department Course & Assessments:    Interventions:  Medications   0.9% sodium chloride BOLUS (0 mLs Intravenous Stopped 5/5/23 1912)       Independent Interpretation (X-rays, CTs, rhythm strip):  Chest x-ray without any opacities, pleural effusions, widened mediastinum.    Consultations/Discussion of Management or Tests:   ED Course as of 05/05/23 1918   Fri May 05, 2023   1638 Performed initial assessment   1724 Reassessed patient       Social Determinants of Health affecting care:   None    Disposition:  The patient was discharged to home.     Impression & Plan      Medical Decision Making:  Uvaldo Ascencio is a 72 year old male who presents with syncopal episode.  Denies any confusion, lethargy, behavioral changes after syncopal episode, wife in agreement.  Does not appear to have experienced postictal phase after event.  Troponin elevated however repeat stable, chest x-ray without etiology for patient's symptoms.  Incidentally noted to have a tortuous thoracic aorta,  however likely noncontributory to events of today.  Patient does take sildenafil, however he states he did not take this today.  Discussed that this can reduce blood pressure but likely not a cause of his symptoms today.  While here, patient's blood pressure remains elevated, denies history of hypertension and not on medication for this at this time.  Patient without any symptoms to suggest endorgan damage.  Recommending patient continue to monitor blood pressure at home, he does have home blood pressure cuff.  He will do this daily and bring results to primary care provider to discuss potential need for starting antihypertensives.  EKG with right bundle branch block today, however patient been found to have this previously.  Labs remarkable for mild anemia with hemoglobin of 11.9.  Patient denies any hematemesis, melena, hematochezia.  Recommend patient get this rechecked with his primary at follow-up appointment.  Patient is safe to discharge home with primary care plans in place.  Discussed reasons to return to ER.  Patient and wife agreeable with this plan    Diagnosis:    ICD-10-CM    1. Syncope  R55       2. Elevated blood pressure reading without diagnosis of hypertension  R03.0            Discharge Medications:  New Prescriptions    No medications on file        5/5/2023   Kacy Barboza PA-C Snell, Lauren R, PA-C  05/05/23 1939

## 2023-05-05 NOTE — ED TRIAGE NOTES
Pt BIBA from clinic. Pt was playing pickleball and felt dizzy and laid down. Pt reports blacking out and denies hitting head. Pt denies CP, SOB, and N&V. Pt went to doctors office and completed an EKG showing a new R BBBs from 2020 EKG. Pt denies cardiac hx.        79 yo female with PMH of RA on methotrexate, emphysema presents with chest pain.     Nuclear stress test - normal. TTE (unofficial result) shows moderate AR - will follow up with cardiology - dr. Torres.    Abdominal US ordered to r/u gallbladder disease as an atypical cause of chest pain shows no gallstone or evidence of acute cholecystitis,    however with incidental finding - 7 mm echogenic right renal nodule. The differential diagnosis is benign angiomyolipoma versus renal cell carcinoma. Recommend further evaluation with CT.    Pt. decided to evaluate outpatient and asked for follow up with nephrology. Pt. will follow up with nephrology - Dr. Whiteside - [facility] of [facility], same as pt's cardiologist - Dr. Torres.     Pt. is stable for discharge with cardiology and nephrology follow up.         REVIEW OF SYSTEMS:    All other review of systems is negative unless indicated above        Vital Signs Last 24 Hrs    T(C): 36.8 (13 Aug 2020 10:56), Max: 37.3 (12 Aug 2020 16:36)    T(F): 98.2 (13 Aug 2020 10:56), Max: 99.1 (12 Aug 2020 16:36)    HR: 94 (13 Aug 2020 10:56) (77 - 94)    BP: 121/78 (13 Aug 2020 10:56) (91/56 - 131/74)    BP(mean): 82 (12 Aug 2020 16:36) (82 - 82)    RR: 16 (13 Aug 2020 10:56) (16 - 18)    SpO2: 97% (13 Aug 2020 10:56) (94% - 98%)        PHYSICAL EXAM:        Constitutional: NAD, awake and alert, well-developed    HEENT: PERR, EOMI, Normal Hearing, MMM    Neck: Soft and supple, No LAD, No JVD    Respiratory: Breath sounds are clear bilaterally, No wheezing, rales or rhonchi    Cardiovascular: S1 and S2, regular rate and rhythm, no Murmurs, gallops or rubs    Gastrointestinal: Bowel Sounds present, soft, nontender, nondistended, no guarding, no rebound    Extremities: No peripheral edema    Vascular: 2+ peripheral pulses    Neurological: A/O x 3, no focal deficits    Musculoskeletal: 5/5 strength b/l upper and lower extremities    Skin: No rashes        Plan:        # chest pain - resolved     trops negative x 3    nuclear stress test normal    f/u with cardiology Dr. Torres        # right kidney nodule - 7 mm    incidental findings r/u bening vs. renal cell carcinoma    outpatient evaluation with CT    f/u with nephrology - Dr. Whiteside         # AR on TTE (unofficial reading)    - f/u with cardiology - dr. Torres

## 2023-05-06 NOTE — DISCHARGE INSTRUCTIONS
Please follow-up with your primary care provider in roughly 1 week to discuss symptoms that you experienced today.  If you develop recurrent symptoms of fainting or feeling faint, please return to ER.    I recommend having your primary care provider recheck your hemoglobin to make sure levels are looking normal.    Please monitor your blood pressure daily at home.  Record these readings and bring these to your primary care visit.  They can discuss starting any medication if that is needed.  If your blood pressure becomes significantly elevated and you experience evidence of headache, vision changes, chest pain, abdominal pain, nausea or vomiting, please return to ER.

## 2023-05-08 ENCOUNTER — MYC MEDICAL ADVICE (OUTPATIENT)
Dept: FAMILY MEDICINE | Facility: CLINIC | Age: 73
End: 2023-05-08
Payer: MEDICARE

## 2023-05-16 ENCOUNTER — OFFICE VISIT (OUTPATIENT)
Dept: FAMILY MEDICINE | Facility: CLINIC | Age: 73
End: 2023-05-16
Payer: MEDICARE

## 2023-05-16 VITALS
RESPIRATION RATE: 17 BRPM | DIASTOLIC BLOOD PRESSURE: 82 MMHG | HEART RATE: 70 BPM | BODY MASS INDEX: 26.69 KG/M2 | SYSTOLIC BLOOD PRESSURE: 136 MMHG | TEMPERATURE: 98 F | WEIGHT: 166.06 LBS | HEIGHT: 66 IN | OXYGEN SATURATION: 98 %

## 2023-05-16 DIAGNOSIS — R55 SYNCOPE, UNSPECIFIED SYNCOPE TYPE: Primary | ICD-10-CM

## 2023-05-16 DIAGNOSIS — E78.2 MIXED HYPERLIPIDEMIA: ICD-10-CM

## 2023-05-16 DIAGNOSIS — D64.9 LOW HEMOGLOBIN: ICD-10-CM

## 2023-05-16 LAB
BASOPHILS # BLD AUTO: 0 10E3/UL (ref 0–0.2)
BASOPHILS NFR BLD AUTO: 1 %
EOSINOPHIL # BLD AUTO: 0.3 10E3/UL (ref 0–0.7)
EOSINOPHIL NFR BLD AUTO: 5 %
ERYTHROCYTE [DISTWIDTH] IN BLOOD BY AUTOMATED COUNT: 13.7 % (ref 10–15)
HCT VFR BLD AUTO: 38.8 % (ref 40–53)
HGB BLD-MCNC: 12.5 G/DL (ref 13.3–17.7)
IMM GRANULOCYTES # BLD: 0 10E3/UL
IMM GRANULOCYTES NFR BLD: 0 %
LYMPHOCYTES # BLD AUTO: 2.4 10E3/UL (ref 0.8–5.3)
LYMPHOCYTES NFR BLD AUTO: 34 %
MCH RBC QN AUTO: 30.2 PG (ref 26.5–33)
MCHC RBC AUTO-ENTMCNC: 32.2 G/DL (ref 31.5–36.5)
MCV RBC AUTO: 94 FL (ref 78–100)
MONOCYTES # BLD AUTO: 0.8 10E3/UL (ref 0–1.3)
MONOCYTES NFR BLD AUTO: 11 %
NEUTROPHILS # BLD AUTO: 3.6 10E3/UL (ref 1.6–8.3)
NEUTROPHILS NFR BLD AUTO: 50 %
PLATELET # BLD AUTO: 333 10E3/UL (ref 150–450)
RBC # BLD AUTO: 4.14 10E6/UL (ref 4.4–5.9)
WBC # BLD AUTO: 7.1 10E3/UL (ref 4–11)

## 2023-05-16 PROCEDURE — 99214 OFFICE O/P EST MOD 30 MIN: CPT | Performed by: FAMILY MEDICINE

## 2023-05-16 PROCEDURE — 36415 COLL VENOUS BLD VENIPUNCTURE: CPT | Performed by: FAMILY MEDICINE

## 2023-05-16 PROCEDURE — 85025 COMPLETE CBC W/AUTO DIFF WBC: CPT | Performed by: FAMILY MEDICINE

## 2023-05-16 RX ORDER — ATORVASTATIN CALCIUM 10 MG/1
10 TABLET, FILM COATED ORAL DAILY
Qty: 90 TABLET | Refills: 1 | Status: SHIPPED | OUTPATIENT
Start: 2023-05-16 | End: 2024-02-07

## 2023-05-16 ASSESSMENT — PAIN SCALES - GENERAL: PAINLEVEL: NO PAIN (0)

## 2023-05-16 NOTE — PROGRESS NOTES
"  Assessment & Plan     Syncope, unspecified syncope type  Patient with a syncopal episode that occurred after patient had been working outside in the heat and then playing pickle ball.  He did have full evaluation in the emergency department after presenting to the clinic.  There is no evidence of any cardiac issues.  This was thought to be related to poor intake with the activity he was undergoing.  He has not had any further issues since that time.  He has been working on increasing his fluids and watching especially when he is active in the yard and when he is playing pickle ball.  Unlikely that this was a seizure.  He did not have any loss of control of bladder or bowel.  He did not seem to have a postictal.  Recheck if further issues with similar.    Mixed hyperlipidemia  Patient was started on Lipitor in the past.  He reports he was regularly taking it after some time and he did not return for the labs.  He did not have any issues with taking the medication as far as no side effects.  Discussed restarting that and then having him return for fasting labs.  Patient agrees and prescription was sent.  Lab appointment was also scheduled.  - atorvastatin (LIPITOR) 10 MG tablet; Take 1 tablet (10 mg) by mouth daily  - Lipid panel reflex to direct LDL Fasting; Future    Low hemoglobin  Patient had a mildly low hemoglobin at the emergency department.  Has not noticed any bleeding issues.  There is normal indices on the labs.  We will recheck again today.  Further evaluation depending on results.  - CBC with platelets and differential; Future  - CBC with platelets and differential           MED REC REQUIRED  Post Medication Reconciliation Status: discharge medications reconciled, continue medications without change  BMI:   Estimated body mass index is 26.6 kg/m  as calculated from the following:    Height as of this encounter: 1.683 m (5' 6.25\").    Weight as of this encounter: 75.3 kg (166 lb 1 oz).           Kathy GUERRIER" MD Yessi  [facility] GITA Bailon is a 72 year old, presenting for the following health issues:  ER F/U ([facility])        5/16/2023     2:42 PM   Additional Questions   Roomed by VE   Accompanied by Spouse     HPI     ED/UC Followup:    Facility:  [facility]  Date of visit: 5/5/2023  Reason for visit: Syncope  Current Status: improving, no recent symptoms    Had been doing a lot of landscaping in the morning and was hot outside.   Patient reports that he was playing pickle ball after doing the landscaping.  He suddenly felt like he could not focus and asked to take a break.  The next thing that happen he sat down and did pass out.  His wife was there and wondered if he might of had some seizure activity.  He did not lose control of his bladder or bowel.  He did not seem to have any confusion after the event.  He did not have any injury from the episode.    He came to the clinic and was evaluated.  There was question of possible right bundle brandy block on his EKG and he was sent to the emergency department for further evaluation.  In the emergency room he had normal troponins, he was found apparently per patient that he had that same EKG change in the past.  He received 2 L of fluid while in the emergency department.  He was eventually discharged from the emergency department to go home.    He reports that since then he has not had any further issues.  He has been working on increasing his fluid intake.  He has had no problems with chest pain, shortness of breath or dizziness.    He was noted in the emergency department to have mildly low hemoglobin.  Will recheck today.    Hyperlipidemia Follow-Up      Are you regularly taking any medication or supplement to lower your cholesterol?   No    Are you having muscle aches or other side effects that you think could be caused by your cholesterol lowering medication?  No        Review of  "Systems   CONSTITUTIONAL: NEGATIVE for fever, chills, change in weight  ENT/MOUTH: NEGATIVE for ear, mouth and throat problems  RESP: NEGATIVE for significant cough or SOB  CV: NEGATIVE for chest pain, palpitations or peripheral edema      Objective    /82   Pulse 70   Temp 98  F (36.7  C) (Temporal)   Resp 17   Ht 1.683 m (5' 6.25\")   Wt 75.3 kg (166 lb 1 oz)   SpO2 98%   BMI 26.60 kg/m    Body mass index is 26.6 kg/m .  Physical Exam   GENERAL: healthy, alert and no distress  NECK: no adenopathy, no asymmetry, masses, or scars and thyroid normal to palpation  RESP: lungs clear to auscultation - no rales, rhonchi or wheezes  CV: regular rate and rhythm, normal S1 S2, no S3 or S4, no murmur, click or rub, no peripheral edema and peripheral pulses strong  MS: no gross musculoskeletal defects noted, no edema                    "

## 2023-05-23 ENCOUNTER — PATIENT OUTREACH (OUTPATIENT)
Dept: CARE COORDINATION | Facility: CLINIC | Age: 73
End: 2023-05-23
Payer: MEDICARE

## 2023-06-07 ENCOUNTER — PATIENT OUTREACH (OUTPATIENT)
Dept: CARE COORDINATION | Facility: CLINIC | Age: 73
End: 2023-06-07
Payer: MEDICARE

## 2023-07-10 ENCOUNTER — LAB (OUTPATIENT)
Dept: LAB | Facility: CLINIC | Age: 73
End: 2023-07-10
Attending: FAMILY MEDICINE
Payer: MEDICARE

## 2023-07-10 DIAGNOSIS — E78.2 MIXED HYPERLIPIDEMIA: ICD-10-CM

## 2023-07-10 LAB
CHOLEST SERPL-MCNC: 180 MG/DL
HDLC SERPL-MCNC: 79 MG/DL
LDLC SERPL CALC-MCNC: 88 MG/DL
NONHDLC SERPL-MCNC: 101 MG/DL
TRIGL SERPL-MCNC: 64 MG/DL

## 2023-07-10 PROCEDURE — 36415 COLL VENOUS BLD VENIPUNCTURE: CPT

## 2023-07-10 PROCEDURE — 80061 LIPID PANEL: CPT

## 2023-07-22 ENCOUNTER — HEALTH MAINTENANCE LETTER (OUTPATIENT)
Age: 73
End: 2023-07-22

## 2024-02-07 DIAGNOSIS — E78.2 MIXED HYPERLIPIDEMIA: ICD-10-CM

## 2024-02-07 RX ORDER — ATORVASTATIN CALCIUM 10 MG/1
10 TABLET, FILM COATED ORAL DAILY
Qty: 90 TABLET | Refills: 0 | Status: SHIPPED | OUTPATIENT
Start: 2024-02-07 | End: 2024-05-13

## 2024-05-11 DIAGNOSIS — E78.2 MIXED HYPERLIPIDEMIA: ICD-10-CM

## 2024-05-13 RX ORDER — ATORVASTATIN CALCIUM 10 MG/1
10 TABLET, FILM COATED ORAL DAILY
Qty: 90 TABLET | Refills: 0 | Status: SHIPPED | OUTPATIENT
Start: 2024-05-13

## 2024-05-16 DIAGNOSIS — E78.2 MIXED HYPERLIPIDEMIA: ICD-10-CM

## 2024-05-16 RX ORDER — ATORVASTATIN CALCIUM 10 MG/1
10 TABLET, FILM COATED ORAL DAILY
Qty: 90 TABLET | Refills: 0 | OUTPATIENT
Start: 2024-05-16

## 2024-09-14 ENCOUNTER — HEALTH MAINTENANCE LETTER (OUTPATIENT)
Age: 74
End: 2024-09-14

## 2025-02-25 ENCOUNTER — TELEPHONE (OUTPATIENT)
Dept: DERMATOLOGY | Facility: CLINIC | Age: 75
End: 2025-02-25

## 2025-02-25 ENCOUNTER — OFFICE VISIT (OUTPATIENT)
Dept: FAMILY MEDICINE | Facility: CLINIC | Age: 75
End: 2025-02-25
Payer: MEDICARE

## 2025-02-25 VITALS
DIASTOLIC BLOOD PRESSURE: 76 MMHG | HEART RATE: 60 BPM | BODY MASS INDEX: 25.39 KG/M2 | SYSTOLIC BLOOD PRESSURE: 138 MMHG | OXYGEN SATURATION: 98 % | WEIGHT: 158 LBS | TEMPERATURE: 98.9 F | HEIGHT: 66 IN | RESPIRATION RATE: 12 BRPM

## 2025-02-25 DIAGNOSIS — R55 NEAR SYNCOPE: ICD-10-CM

## 2025-02-25 DIAGNOSIS — D48.5 NEOPLASM OF UNCERTAIN BEHAVIOR OF SKIN: ICD-10-CM

## 2025-02-25 DIAGNOSIS — E78.2 MIXED HYPERLIPIDEMIA: ICD-10-CM

## 2025-02-25 DIAGNOSIS — R01.1 HEART MURMUR: ICD-10-CM

## 2025-02-25 DIAGNOSIS — Z23 NEED FOR TDAP VACCINATION: ICD-10-CM

## 2025-02-25 DIAGNOSIS — Z11.59 SCREENING FOR VIRAL DISEASE: ICD-10-CM

## 2025-02-25 DIAGNOSIS — Z00.00 ENCOUNTER FOR MEDICARE ANNUAL WELLNESS EXAM: Primary | ICD-10-CM

## 2025-02-25 LAB
ALBUMIN SERPL BCG-MCNC: 4.1 G/DL (ref 3.5–5.2)
ALP SERPL-CCNC: 58 U/L (ref 40–150)
ALT SERPL W P-5'-P-CCNC: 14 U/L (ref 0–70)
ANION GAP SERPL CALCULATED.3IONS-SCNC: 11 MMOL/L (ref 7–15)
AST SERPL W P-5'-P-CCNC: 23 U/L (ref 0–45)
BASOPHILS # BLD AUTO: 0 10E3/UL (ref 0–0.2)
BASOPHILS NFR BLD AUTO: 0 %
BILIRUB SERPL-MCNC: 0.3 MG/DL
BUN SERPL-MCNC: 19.4 MG/DL (ref 8–23)
CALCIUM SERPL-MCNC: 9.4 MG/DL (ref 8.8–10.4)
CHLORIDE SERPL-SCNC: 105 MMOL/L (ref 98–107)
CHOLEST SERPL-MCNC: 220 MG/DL
CREAT SERPL-MCNC: 1.01 MG/DL (ref 0.67–1.17)
EGFRCR SERPLBLD CKD-EPI 2021: 79 ML/MIN/1.73M2
EOSINOPHIL # BLD AUTO: 0.2 10E3/UL (ref 0–0.7)
EOSINOPHIL NFR BLD AUTO: 3 %
ERYTHROCYTE [DISTWIDTH] IN BLOOD BY AUTOMATED COUNT: 13.4 % (ref 10–15)
FASTING STATUS PATIENT QL REPORTED: YES
FASTING STATUS PATIENT QL REPORTED: YES
GLUCOSE SERPL-MCNC: 100 MG/DL (ref 70–99)
HCO3 SERPL-SCNC: 25 MMOL/L (ref 22–29)
HCT VFR BLD AUTO: 40.6 % (ref 40–53)
HDLC SERPL-MCNC: 77 MG/DL
HGB BLD-MCNC: 13.6 G/DL (ref 13.3–17.7)
IMM GRANULOCYTES # BLD: 0 10E3/UL
IMM GRANULOCYTES NFR BLD: 0 %
LDLC SERPL CALC-MCNC: 120 MG/DL
LYMPHOCYTES # BLD AUTO: 2 10E3/UL (ref 0.8–5.3)
LYMPHOCYTES NFR BLD AUTO: 32 %
MCH RBC QN AUTO: 31 PG (ref 26.5–33)
MCHC RBC AUTO-ENTMCNC: 33.5 G/DL (ref 31.5–36.5)
MCV RBC AUTO: 93 FL (ref 78–100)
MONOCYTES # BLD AUTO: 0.6 10E3/UL (ref 0–1.3)
MONOCYTES NFR BLD AUTO: 10 %
NEUTROPHILS # BLD AUTO: 3.3 10E3/UL (ref 1.6–8.3)
NEUTROPHILS NFR BLD AUTO: 54 %
NONHDLC SERPL-MCNC: 143 MG/DL
PLATELET # BLD AUTO: 345 10E3/UL (ref 150–450)
POTASSIUM SERPL-SCNC: 5.2 MMOL/L (ref 3.4–5.3)
PROT SERPL-MCNC: 6.9 G/DL (ref 6.4–8.3)
RBC # BLD AUTO: 4.39 10E6/UL (ref 4.4–5.9)
SODIUM SERPL-SCNC: 141 MMOL/L (ref 135–145)
TRIGL SERPL-MCNC: 115 MG/DL
WBC # BLD AUTO: 6.1 10E3/UL (ref 4–11)

## 2025-02-25 PROCEDURE — 80053 COMPREHEN METABOLIC PANEL: CPT | Performed by: FAMILY MEDICINE

## 2025-02-25 PROCEDURE — 3075F SYST BP GE 130 - 139MM HG: CPT | Performed by: FAMILY MEDICINE

## 2025-02-25 PROCEDURE — 86787 VARICELLA-ZOSTER ANTIBODY: CPT | Performed by: FAMILY MEDICINE

## 2025-02-25 PROCEDURE — 3078F DIAST BP <80 MM HG: CPT | Performed by: FAMILY MEDICINE

## 2025-02-25 PROCEDURE — 1126F AMNT PAIN NOTED NONE PRSNT: CPT | Performed by: FAMILY MEDICINE

## 2025-02-25 PROCEDURE — G2211 COMPLEX E/M VISIT ADD ON: HCPCS | Performed by: FAMILY MEDICINE

## 2025-02-25 PROCEDURE — 99214 OFFICE O/P EST MOD 30 MIN: CPT | Mod: 25 | Performed by: FAMILY MEDICINE

## 2025-02-25 PROCEDURE — 36415 COLL VENOUS BLD VENIPUNCTURE: CPT | Performed by: FAMILY MEDICINE

## 2025-02-25 PROCEDURE — G0439 PPPS, SUBSEQ VISIT: HCPCS | Performed by: FAMILY MEDICINE

## 2025-02-25 PROCEDURE — 85025 COMPLETE CBC W/AUTO DIFF WBC: CPT | Performed by: FAMILY MEDICINE

## 2025-02-25 PROCEDURE — 80061 LIPID PANEL: CPT | Performed by: FAMILY MEDICINE

## 2025-02-25 PROCEDURE — 86735 MUMPS ANTIBODY: CPT | Performed by: FAMILY MEDICINE

## 2025-02-25 PROCEDURE — 86762 RUBELLA ANTIBODY: CPT | Performed by: FAMILY MEDICINE

## 2025-02-25 PROCEDURE — 86762 RUBELLA ANTIBODY: CPT | Mod: 59 | Performed by: FAMILY MEDICINE

## 2025-02-25 PROCEDURE — 86765 RUBEOLA ANTIBODY: CPT | Performed by: FAMILY MEDICINE

## 2025-02-25 SDOH — HEALTH STABILITY: PHYSICAL HEALTH: ON AVERAGE, HOW MANY DAYS PER WEEK DO YOU ENGAGE IN MODERATE TO STRENUOUS EXERCISE (LIKE A BRISK WALK)?: 3 DAYS

## 2025-02-25 ASSESSMENT — SOCIAL DETERMINANTS OF HEALTH (SDOH): HOW OFTEN DO YOU GET TOGETHER WITH FRIENDS OR RELATIVES?: THREE TIMES A WEEK

## 2025-02-25 ASSESSMENT — PAIN SCALES - GENERAL: PAINLEVEL_OUTOF10: NO PAIN (0)

## 2025-02-25 NOTE — PATIENT INSTRUCTIONS
Add Liquid IV to water once a day. Or Gatorade or Propel.     Recommend       Patient Education   Preventive Care Advice   This is general advice given by our system to help you stay healthy. However, your care team may have specific advice just for you. Please talk to your care team about your preventive care needs.  Nutrition  Eat 5 or more servings of fruits and vegetables each day.  Try wheat bread, brown rice and whole grain pasta (instead of white bread, rice, and pasta).  Get enough calcium and vitamin D. Check the label on foods and aim for 100% of the RDA (recommended daily allowance).  Lifestyle  Exercise at least 150 minutes each week  (30 minutes a day, 5 days a week).  Do muscle strengthening activities 2 days a week. These help control your weight and prevent disease.  No smoking.  Wear sunscreen to prevent skin cancer.  Have a dental exam and cleaning every 6 months.  Yearly exams  See your health care team every year to talk about:  Any changes in your health.  Any medicines your care team has prescribed.  Preventive care, family planning, and ways to prevent chronic diseases.  Shots (vaccines)   HPV shots (up to age 26), if you've never had them before.  Hepatitis B shots (up to age 59), if you've never had them before.  COVID-19 shot: Get this shot when it's due.  Flu shot: Get a flu shot every year.  Tetanus shot: Get a tetanus shot every 10 years.  Pneumococcal, hepatitis A, and RSV shots: Ask your care team if you need these based on your risk.  Shingles shot (for age 50 and up)  General health tests  Diabetes screening:  Starting at age 35, Get screened for diabetes at least every 3 years.  If you are younger than age 35, ask your care team if you should be screened for diabetes.  Cholesterol test: At age 39, start having a cholesterol test every 5 years, or more often if advised.  Bone density scan (DEXA): At age 50, ask your care team if you should have this scan for osteoporosis (brittle  bones).  Hepatitis C: Get tested at least once in your life.  STIs (sexually transmitted infections)  Before age 24: Ask your care team if you should be screened for STIs.  After age 24: Get screened for STIs if you're at risk. You are at risk for STIs (including HIV) if:  You are sexually active with more than one person.  You don't use condoms every time.  You or a partner was diagnosed with a sexually transmitted infection.  If you are at risk for HIV, ask about PrEP medicine to prevent HIV.  Get tested for HIV at least once in your life, whether you are at risk for HIV or not.  Cancer screening tests  Cervical cancer screening: If you have a cervix, begin getting regular cervical cancer screening tests starting at age 21.  Breast cancer scan (mammogram): If you've ever had breasts, begin having regular mammograms starting at age 40. This is a scan to check for breast cancer.  Colon cancer screening: It is important to start screening for colon cancer at age 45.  Have a colonoscopy test every 10 years (or more often if you're at risk) Or, ask your provider about stool tests like a FIT test every year or Cologuard test every 3 years.  To learn more about your testing options, visit:   .  For help making a decision, visit:   https://bit.ly/rr87548.  Prostate cancer screening test: If you have a prostate, ask your care team if a prostate cancer screening test (PSA) at age 55 is right for you.  Lung cancer screening: If you are a current or former smoker ages 50 to 80, ask your care team if ongoing lung cancer screenings are right for you.  For informational purposes only. Not to replace the advice of your health care provider. Copyright   2023 [employer]. All rights reserved. Clinically reviewed by the [facility]. [employer] [id number] - REV 01/24.  Hearing Loss: Care Instructions  Overview     Hearing loss is a sudden or slow decrease in how well you hear. It can range from  slight to profound. Permanent hearing loss can occur with aging. It also can happen when you are exposed long-term to loud noise. Examples include listening to loud music, riding motorcycles, or being around other loud machines.  Hearing loss can affect your work and home life. It can make you feel lonely or depressed. You may feel that you have lost your independence. But hearing aids and other devices can help you hear better and feel connected to others.  Follow-up care is a key part of your treatment and safety. Be sure to make and go to all appointments, and call your doctor if you are having problems. It's also a good idea to know your test results and keep a list of the medicines you take.  How can you care for yourself at home?  Avoid loud noises whenever possible. This helps keep your hearing from getting worse.  Always wear hearing protection around loud noises.  Wear a hearing aid as directed.  A professional can help you pick a hearing aid that will work best for you.  You can also get hearing aids over the counter for mild to moderate hearing loss.  Have hearing tests as your doctor suggests. They can show whether your hearing has changed. Your hearing aid may need to be adjusted.  Use other devices as needed. These may include:  Telephone amplifiers and hearing aids that can connect to a television, stereo, radio, or microphone.  Devices that use lights or vibrations. These alert you to the doorbell, a ringing telephone, or a baby monitor.  Television closed-captioning. This shows the words at the bottom of the screen. Most new TVs can do this.  TTY (text telephone). This lets you type messages back and forth on the telephone instead of talking or listening. These devices are also called TDD. When messages are typed on the keyboard, they are sent over the phone line to a receiving TTY. The message is shown on a monitor.  Use text messaging, social media, and email if it is hard for you to communicate  "by telephone.  Try to learn a listening technique called speechreading. It is not lipreading. You pay attention to people's gestures, expressions, posture, and tone of voice. These clues can help you understand what a person is saying. Face the person you are talking to, and have them face you. Make sure the lighting is good. You need to see the other person's face clearly.  Think about counseling if you need help to adjust to your hearing loss.  When should you call for help?  Watch closely for changes in your health, and be sure to contact your doctor if:    You think your hearing is getting worse.     You have new symptoms, such as dizziness or nausea.   Where can you learn more?  Go to https://www.[employer].net/patiented  Enter R798 in the search box to learn more about \"Hearing Loss: Care Instructions.\"  Current as of: September 27, 2023  Content Version: 14.3    2024 [email or web address].   Care instructions adapted under license by your healthcare professional. If you have questions about a medical condition or this instruction, always ask your healthcare professional. [email or web address] disclaims any warranty or liability for your use of this information.    9 Ways to Cut Back on Drinking  Maybe you've found yourself drinking more alcohol than you'd prefer. If you want to cut back, here are some ideas to try.    Think before you drink.  Do you really want a drink, or is it just a habit? If you're used to having a drink at a certain time, try doing something else then.     Look for substitutes.  Find some no-alcohol drinks that you enjoy, like flavored seltzer water, tea with honey, or tonic with a slice of lime. Or try alcohol-free beer or \"virgin\" cocktails (without the alcohol).     Drink more water.  Use water to quench your thirst. Drink a glass of water before you have any alcohol. Have another glass along with every drink or between drinks.     Shrink your drink.  For example, have a bottle of " "beer instead of a pint. Use a smaller glass for wine. Choose drinks with lower alcohol content (ABV%). Or use less liquor and more mixer in cocktails.     Slow down.  It's easy to drink quickly and without thinking about it. Pay attention, and make each drink last longer.     Do the math.  Total up how much you spend on alcohol each month. How much is that a year? If you cut back, what could you do with the money you save?     Take a break.  Choose a day or two each week when you won't drink at all. Notice how you feel on those days, physically and emotionally. How did you sleep? Do you feel better? Over time, add more break days.     Count calories.  Would you like to lose some weight? For some people that's a good motivator for cutting back. Figure out how many calories are in each drink. How many does that add up to in a day? In a week? In a month?     Practice saying no.  Be ready when someone offers you a drink. Try: \"Thanks, I've had enough.\" Or \"Thanks, but I'm cutting back.\" Or \"No, thanks. I feel better when I drink less.\"   Current as of: November 15, 2023  Content Version: 14.3    2024 [employer].   Care instructions adapted under license by your healthcare professional. If you have questions about a medical condition or this instruction, always ask your healthcare professional. [employer] disclaims any warranty or liability for your use of this information.     "

## 2025-02-25 NOTE — PROGRESS NOTES
Preventive Care Visit  [facility] GITA Dickson MD, Family Medicine  Feb 25, 2025      Assessment & Plan     Encounter for Medicare annual wellness exam  See counseling messages     Mixed hyperlipidemia  Patient has been off the statin due to muscle pain.   Will recheck at this time.   Discussed potential start of crestor.   He will consider.   - Lipid panel reflex to direct LDL Non-fasting; Future  - Lipid panel reflex to direct LDL Non-fasting    Near syncope  Patient was seen in the ED for syncopal spell 5/2023.   No clear cause found during the ED visit.   He has had 2 further episodes since then. Last was summer 2024.   No passing out. He was able to get to the ground and recover.   Reports he is not good at drinking fluids. He is working on that.   New murmur heard on exam. Will check echo.   Will check labs as noted.   Recommend increased fluids. Trial of Liquid IV daily or Gatorade or similar.   Consider ziopatch though no symptoms since last summer.   Will notify of results.   - Comprehensive metabolic panel (BMP + Alb, Alk Phos, ALT, AST, Total. Bili, TP); Future  - CBC with platelets and differential; Future  - Comprehensive metabolic panel (BMP + Alb, Alk Phos, ALT, AST, Total. Bili, TP)  - CBC with platelets and differential    Neoplasm of uncertain behavior of skin  Right cheek. History of basal cell on his leg.   Concern for skin cancer.   Referral to derm.   - Adult Dermatology  Referral; Future    Heart murmur  New murmur.   See above.   Will notify of results.   - Echocardiogram Complete; Future    Screening for viral disease  Patient requesting MMR vaccine and chickenpox vaccine.   He believes he was vaccinated but no completely sure.   He doesn't know if he had chickenpox.   Will check titers.  Will notify of results.   - Varicella Zoster Virus Antibody IgG; Future  - Rubella antibody IgM; Future  - Rubeola Antibody IgG; Future  - Rubella Antibody IgG;  "Future  - Varicella Zoster Virus Antibody IgG  - Rubella antibody IgM  - Rubeola Antibody IgG  - Rubella Antibody IgG    Need for Tdap vaccination  Patient is due. Recommended being done at the pharmacy.         The longitudinal plan of care for the diagnosis(es)/condition(s) as documented were addressed during this visit. Due to the added complexity in care, I will continue to support Uvaldo in the subsequent management and with ongoing continuity of care.    BMI  Estimated body mass index is 25.5 kg/m  as calculated from the following:    Height as of this encounter: 1.676 m (5' 6\").    Weight as of this encounter: 71.7 kg (158 lb).       Counseling  Appropriate preventive services were addressed with this patient via screening, questionnaire, or discussion as appropriate for fall prevention, nutrition, physical activity, Tobacco-use cessation, social engagement, weight loss and cognition.  Checklist reviewing preventive services available has been given to the patient.  Reviewed patient's diet, addressing concerns and/or questions.   He is at risk for lack of exercise and has been provided with information to increase physical activity for the benefit of his well-being.   The patient reports drinking more than 3 alcoholic drinks per day and/or more than 7 drhnks per week. The patient was counseled and given information about possible harmful effects of excessive alcohol intake.The patient was provided with written information regarding signs of hearing loss.           Ivonne Bailon is a 74 year old, presenting for the following:  Wellness Visit        2/25/2025     9:21 AM   Additional Questions   Roomed by BT   Accompanied by SELF         2/25/2025     9:21 AM   Patient Reported Additional Medications   Patient reports taking the following new medications magnesium           HPI    Syncope 2 episodes since 2023. Last episode last summer. Was playing pickleball. Same with first episode. Feels it come on. Feels " like things are closing in. Gets himself on the ground. Able to get up after a few minutes. Reports that he has been working on drinking water. Reports appetite is ok. Episodes have been in the summer. He reports that he does a lot of yard work in the summer. Has not had any chest pain, shortness of breath or palpitations. Otherwise has felt fine.     Wants to know if he is immune to measles and chicken pox    Lesion right cheek. Several years. Now bleeding.           Hyperlipidemia Follow-Up    Are you regularly taking any medication or supplement to lower your cholesterol?   No stopped lipitor due to leg cramps  Are you having muscle aches or other side effects that you think could be caused by your cholesterol lowering medication?  Yes- stopped the lipitor    Health Care Directive  Patient does not have a Health Care Directive: Patient states has Advance Directive and will bring in a copy to clinic.      2/25/2025   General Health   How would you rate your overall physical health? Excellent   Feel stress (tense, anxious, or unable to sleep) Not at all         2/25/2025   Nutrition   Diet: Regular (no restrictions)         2/25/2025   Exercise   Days per week of moderate/strenous exercise 3 days         2/25/2025   Social Factors   Frequency of gathering with friends or relatives Three times a week   Worry food won't last until get money to buy more No   Food not last or not have enough money for food? No   Do you have housing? (Housing is defined as stable permanent housing and does not include staying ouside in a car, in a tent, in an abandoned building, in an overnight shelter, or couch-surfing.) Yes   Are you worried about losing your housing? No   Lack of transportation? No   Unable to get utilities (heat,electricity)? No         2/25/2025   Fall Risk   Fallen 2 or more times in the past year? No   Trouble with walking or balance? No          2/25/2025   Activities of Daily Living- Home Safety   Needs help  with the following daily activites None of the above   Safety concerns in the home None of the above         2/25/2025   Dental   Dentist two times every year? Yes         2/25/2025   Hearing Screening   Hearing concerns? (!) IT'S HARD TO FOLLOW A CONVERSATION IN A NOISY RESTAURANT OR CROWDED ROOM.         2/25/2025   Driving Risk Screening   Patient/family members have concerns about driving No         2/25/2025   General Alertness/Fatigue Screening   Have you been more tired than usual lately? No         2/25/2025   Urinary Incontinence Screening   Bothered by leaking urine in past 6 months No            Today's PHQ-2 Score:       2/25/2025     9:15 AM   PHQ-2 ( 1999 [employer])   Q1: Little interest or pleasure in doing things 0   Q2: Feeling down, depressed or hopeless 0   PHQ-2 Score 0    Q1: Little interest or pleasure in doing things Not at all   Q2: Feeling down, depressed or hopeless Not at all   PHQ-2 Score 0       Patient-reported           2/25/2025   Substance Use   Alcohol more than 3/day or more than 7/wk Yes   How often do you have a drink containing alcohol 2 to 3 times a week   How many alcohol drinks on typical day 3 or 4   How often do you have 5+ drinks at one occasion Monthly   Audit 2/3 Score 3   How often not able to stop drinking once started Never   How often failed to do what normally expected Never   How often needed first drink in am after a heavy drinking session Never   How often feeling of guilt or remorse after drinking Never   How often unable to remember what happened the night before Never   Have you or someone else been injured because of your drinking No   Has anyone been concerned or suggested you cut down on drinking No   TOTAL SCORE - AUDIT 6   Do you have a current opioid prescription? No   How severe/bad is pain from 1 to 10? 0/10 (No Pain)   Do you use any other substances recreationally? No     Social History     Tobacco Use    Smoking status: Former     Types: Cigars     Smokeless tobacco: Never   Vaping Use    Vaping status: Never Used   Substance Use Topics    Alcohol use: Yes     Comment: 0-2 per week     Drug use: No           2/25/2025   AAA Screening   Family history of Abdominal Aortic Aneurysm (AAA)? No   ASCVD Risk   The 10-year ASCVD risk score (Richa HARRISON, et al., 2019) is: 24.4%    Values used to calculate the score:      Age: 74 years      Sex: Male      Is Non- : No      Diabetic: No      Tobacco smoker: No      Systolic Blood Pressure: 156 mmHg      Is BP treated: No      HDL Cholesterol: 79 mg/dL      Total Cholesterol: 180 mg/dL            Reviewed and updated as needed this visit by Provider                    BP Readings from Last 3 Encounters:   02/25/25 138/76   05/16/23 136/82   05/05/23 (!) 165/84    Wt Readings from Last 3 Encounters:   02/25/25 71.7 kg (158 lb)   05/16/23 75.3 kg (166 lb 1 oz)   06/22/22 76.7 kg (169 lb)                  Current providers sharing in care for this patient include:  Patient Care Team:  Kathy Dickson MD as PCP - General (Family Practice)  Kathy Dickson MD as Assigned PCP    The following health maintenance items are reviewed in Epic and correct as of today:  Health Maintenance   Topic Date Due    ZOSTER IMMUNIZATION (1 of 2) Never done    LUNG CANCER SCREENING  Never done    DTAP/TDAP/TD IMMUNIZATION (2 - Td or Tdap) 04/05/2021    MEDICARE ANNUAL WELLNESS VISIT  06/22/2023    ANNUAL REVIEW OF HM ORDERS  06/22/2023    LIPID  07/10/2024    INFLUENZA VACCINE (1) 09/01/2024    COVID-19 Vaccine (4 - 2024-25 season) 09/01/2024    FALL RISK ASSESSMENT  02/25/2026    GLUCOSE  05/05/2026    COLORECTAL CANCER SCREENING  07/15/2026    RSV VACCINE (1 - 1-dose 75+ series) 11/25/2026    ADVANCE CARE PLANNING  06/22/2027    HEPATITIS C SCREENING  Completed    PHQ-2 (once per calendar year)  Completed    Pneumococcal Vaccine: 50+ Years  Completed    HPV IMMUNIZATION  Aged Out    MENINGITIS IMMUNIZATION  " Aged Out            Objective    Exam  /76   Pulse 60   Temp 98.9  F (37.2  C) (Temporal)   Resp 12   Ht 1.676 m (5' 6\")   Wt 71.7 kg (158 lb)   SpO2 98%   BMI 25.50 kg/m     Estimated body mass index is 25.5 kg/m  as calculated from the following:    Height as of this encounter: 1.676 m (5' 6\").    Weight as of this encounter: 71.7 kg (158 lb).    Physical Exam  GENERAL: alert and no distress  EYES: Eyes grossly normal to inspection, PERRL and conjunctivae and sclerae normal  HENT: ear canals and TM's normal, nose and mouth without ulcers or lesions  NECK: no adenopathy, no asymmetry, masses, or scars  RESP: lungs clear to auscultation - no rales, rhonchi or wheezes  CV: regular rates and rhythm, normal S1 S2, no S3 or S4, grade 2/6 systolic murmur heard best over the right upper sternal border, peripheral pulses strong, and no peripheral edema  ABDOMEN: soft, nontender, no hepatosplenomegaly, no masses and bowel sounds normal  MS: no gross musculoskeletal defects noted, no edema  SKIN: right cheek 1 x 1.5 cm lesion right cheek, moist appearing, raised centrally.   NEURO: Normal strength and tone, mentation intact and speech normal  PSYCH: mentation appears normal, affect normal/bright        2/25/2025   Mini Cog   Clock Draw Score 2 Normal   3 Item Recall 3 objects recalled   Mini Cog Total Score 5              Signed Electronically by: Kathy Dickson MD    "

## 2025-02-25 NOTE — TELEPHONE ENCOUNTER
Left vm with [facility] phone number for pt to schedule a new pt appt.     suspicious skin lesion, history of skin cancer BCC        Kasie Hartley, Complex  2/25/2025 2:58 PM

## 2025-02-26 ENCOUNTER — OFFICE VISIT (OUTPATIENT)
Dept: DERMATOLOGY | Facility: CLINIC | Age: 75
End: 2025-02-26
Payer: MEDICARE

## 2025-02-26 DIAGNOSIS — D48.5 NEOPLASM OF UNCERTAIN BEHAVIOR OF SKIN: Primary | ICD-10-CM

## 2025-02-26 DIAGNOSIS — Z11.59 SCREENING FOR VIRAL DISEASE: Primary | ICD-10-CM

## 2025-02-26 DIAGNOSIS — L57.0 ACTINIC KERATOSIS: ICD-10-CM

## 2025-02-26 DIAGNOSIS — E78.2 MIXED HYPERLIPIDEMIA: ICD-10-CM

## 2025-02-26 LAB
MEV IGG SER IA-ACNC: >300 AU/ML
MEV IGG SER IA-ACNC: POSITIVE
RUBV IGG SERPL QL IA: 3.01 INDEX
RUBV IGG SERPL QL IA: POSITIVE
RUBV IGM SER IA-ACNC: <10 AU/ML
RUBV IGM SER QL: NEGATIVE
VZV IGG SER QL IA: 2.22 S/CO
VZV IGG SER QL IA: POSITIVE

## 2025-02-26 RX ORDER — ROSUVASTATIN CALCIUM 5 MG/1
5 TABLET, COATED ORAL DAILY
Qty: 90 TABLET | Refills: 1 | Status: SHIPPED | OUTPATIENT
Start: 2025-02-26

## 2025-02-26 NOTE — LETTER
2/26/2025      Uvaldo Ascencio  [address] [place] [address]      Dear Colleague,    Thank you for referring your patient, Uvaldo Ascencio, to the [facility]. Please see a copy of my visit note below.    [facility] Dermatology Note    Encounter Date: Feb 26, 2025    Dermatology Problem List:  Last skin check 9/14/21, recommended yearly.  0. NUBs, R cheek, L temple, s/p bx 2/26/25, ddx BCC, path pending  1. Hx of NMSC  - Superficial BCC, left shin, s/p biopsy 2/21/2019, efudex cream x6 weeks.   2. Actinic keratoses, diffuse  - s/p efudex 5% cream BID for 3-4 weeks to scalp/face, initiated 2/21/2019  - s/p cryotherapy  3. BLK, left supraclavicular chest, s/p biopsy 2/21/19     Social history: Patient is retired from [employer]. 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 Spends a lot of time outside with grandchildren. Tries to wear hat when outdoors.  Family history: Negative for skin cancer.    ______________________________________    Impression/Plan:    Neoplasm of unspecified behavior of the skin (D49.2) on the R cheek. The differential diagnosis includes BCC.   - Shave biopsy performed today. See procedure section.    Neoplasm of unspecified behavior of the skin (D49.2) on the L temple. The differential diagnosis includes BCC.   - Shave biopsy performed today. See procedure section.    3. Actinic keratosis, face and scalp x 11  - Cryotherapy performed today. See procedure section.      Procedures:  Shave biopsy x 2: Location(s) R cheek and L temple.  After discussion of benefits and risks including but not limited to bleeding/bruising, pain/swelling, infection, scar, incomplete removal, nerve damage/numbness, recurrence, and non-diagnostic biopsy,  verbal consent and photographs were obtained. Time-out was performed. The area was cleaned with isopropyl alcohol. 0.5mL of 1% lidocaine with epinephrine was injected to obtain adequate anesthesia of each lesion. Shave  biopsy was performed. Hemostasis was achieved with aluminium chloride. Vaseline and a sterile dressing were applied. The patient tolerated the procedure and no complications were noted. The patient was provided with verbal and written post care instructions.      Cryotherapy procedure note: After verbal consent and discussion of risks and benefits including, but not limited to, dyspigmentation/scar, blister, and pain, 11 AKs was(were) treated with 1-2 mm freeze border for 1-2 cycles with liquid nitrogen. Post cryotherapy instructions were provided.         Follow-up in 6 months.       Staff Involved:  Staff and Scribe  Scribe Disclosure:   I, Geetha Yoo, am serving as a scribe to document services personally performed by Edmundo Garnica MD based on data collection and the provider's statements to me.     Provider Disclosure:   The documentation recorded by the scribe accurately reflects the services I personally performed and the decisions made by me.    Edmundo Garnica MD   of Dermatology  Department of Dermatology  [facility]        CC:   Chief Complaint   Patient presents with     Derm Problem     Spot check on right cheek only per patient, Hx BCC       History of Present Illness:  Mr. Uvaldo Ascencio is a 74 year old male who presents as a new patient. Previously seen by Dr Chang, last in 2021. Referred by Dr Kathy Dickson for Neoplasm of uncertain behavior of skin.    Today, patient reports spot on R cheek he would like checked. Patient has a history of BCC.     Labs:  N/a    Physical exam:  Vitals: There were no vitals taken for this visit.  GEN: This is a well developed, well-nourished male in no acute distress, in a pleasant mood.    SKIN: Carlin phototype II  - Focused examination of the face and scalp was performed.  - There is a pearly ulcerated plaque on the R cheek.  - There is a pearly papule on the L temple.  - There are scaly  erythematous macules on the face and scalp x 11.  - No other lesions of concern on areas examined.     Past Medical History:   No past medical history on file.  Past Surgical History:   Procedure Laterality Date     APPENDECTOMY      as a teen     ARTHROPLASTY KNEE UNICOMPARTMENT  01/27/12    [facility]     COLONOSCOPY WITH CO2 INSUFFLATION N/A 7/15/2016    Procedure: COLONOSCOPY WITH CO2 INSUFFLATION;  Surgeon: Kathy Dickson MD;  Location: [facility] OR     HERNIA REPAIR Right 2003    right sided     HERNIA REPAIR Left        Social History:   reports that he has quit smoking. His smoking use included cigars. He has never used smokeless tobacco. He reports current alcohol use. He reports that he does not use drugs.    Family History:  Family History   Problem Relation Age of Onset     Heart Disease Father      Breast Cancer Sister      Asthma No family hx of      C.A.D. No family hx of      Diabetes No family hx of      Hypertension No family hx of      Cerebrovascular Disease No family hx of      Cancer - colorectal No family hx of      Prostate Cancer No family hx of      Alcohol/Drug No family hx of      Allergies No family hx of      Alzheimer Disease No family hx of      Anesthesia Reaction No family hx of      Arthritis No family hx of      Circulatory No family hx of      Congenital Anomalies No family hx of      Blood Disease No family hx of      Cancer No family hx of      Cardiovascular No family hx of      Connective Tissue Disorder No family hx of      Skin Cancer No family hx of        Medications:  Current Outpatient Medications   Medication Sig Dispense Refill     Omeprazole Magnesium (PRILOSEC OTC PO)        Allergies   Allergen Reactions     No Known Drug Allergy                Again, thank you for allowing me to participate in the care of your patient.        Sincerely,    Edmundo Garnica MD    Electronically signed

## 2025-02-26 NOTE — NURSING NOTE
Uvaldo Ascencio's goals for this visit include:   Chief Complaint   Patient presents with    Derm Problem     Spot check on right cheek only per patient, Hx BCC       He requests these members of his care team be copied on today's visit information: no    PCP: Kathy Dickson    Referring Provider:  Referred Self, MD  No address on file    There were no vitals taken for this visit.    Do you need any medication refills at today's visit? No    Sienna Landrum LPN

## 2025-02-26 NOTE — NURSING NOTE
The following medication was given:     MEDICATION:  Lidocaine with epinephrine 1%  ROUTE: [place]  SITE: see procedure note  DOSE: see procedure note  LOT #: [id number]  : [employer]  EXPIRATION DATE: 8/30/26  NDC#: 10624-622-15    Was there drug waste? Yes  Multi-dose vial: Yes    Sienna Landrum LPN  February 26, 2025

## 2025-02-26 NOTE — PROGRESS NOTES
[facility] Dermatology Note    Encounter Date: Feb 26, 2025    Dermatology Problem List:  Last skin check 9/14/21, recommended yearly.  0. NUBs, R cheek, L temple, s/p bx 2/26/25, ddx BCC, path pending  1. Hx of NMSC  - Superficial BCC, left shin, s/p biopsy 2/21/2019, efudex cream x6 weeks.   2. Actinic keratoses, diffuse  - s/p efudex 5% cream BID for 3-4 weeks to scalp/face, initiated 2/21/2019  - s/p cryotherapy  3. BLK, left supraclavicular chest, s/p biopsy 2/21/19     Social history: Patient is retired from [employer]. 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 Spends a lot of time outside with grandchildren. Tries to wear hat when outdoors.  Family history: Negative for skin cancer.    ______________________________________    Impression/Plan:    Neoplasm of unspecified behavior of the skin (D49.2) on the R cheek. The differential diagnosis includes BCC.   - Shave biopsy performed today. See procedure section.    Neoplasm of unspecified behavior of the skin (D49.2) on the L temple. The differential diagnosis includes BCC.   - Shave biopsy performed today. See procedure section.    3. Actinic keratosis, face and scalp x 11  - Cryotherapy performed today. See procedure section.      Procedures:  Shave biopsy x 2: Location(s) R cheek and L temple.  After discussion of benefits and risks including but not limited to bleeding/bruising, pain/swelling, infection, scar, incomplete removal, nerve damage/numbness, recurrence, and non-diagnostic biopsy,  verbal consent and photographs were obtained. Time-out was performed. The area was cleaned with isopropyl alcohol. 0.5mL of 1% lidocaine with epinephrine was injected to obtain adequate anesthesia of each lesion. Shave biopsy was performed. Hemostasis was achieved with aluminium chloride. Vaseline and a sterile dressing were applied. The patient tolerated the procedure and no complications were noted. The patient was provided with verbal and written post  care instructions.      Cryotherapy procedure note: After verbal consent and discussion of risks and benefits including, but not limited to, dyspigmentation/scar, blister, and pain, 11 AKs was(were) treated with 1-2 mm freeze border for 1-2 cycles with liquid nitrogen. Post cryotherapy instructions were provided.         Follow-up in 6 months.       Staff Involved:  Staff and Scribe  Scribe Disclosure:   I, Geetha Fredo, am serving as a scribe to document services personally performed by Edmundo Garnica MD based on data collection and the provider's statements to me.     Provider Disclosure:   The documentation recorded by the scribe accurately reflects the services I personally performed and the decisions made by me.    Edmundo Garnica MD   of Dermatology  Department of Dermatology  [facility] [employer]        CC:   Chief Complaint   Patient presents with    Derm Problem     Spot check on right cheek only per patient, Hx BCC       History of Present Illness:  Mr. Uvaldo Ascencio is a 74 year old male who presents as a new patient. Previously seen by Dr Chang, last in 2021. Referred by Dr Kathy Dickson for Neoplasm of uncertain behavior of skin.    Today, patient reports spot on R cheek he would like checked. Patient has a history of BCC.     Labs:  N/a    Physical exam:  Vitals: There were no vitals taken for this visit.  GEN: This is a well developed, well-nourished male in no acute distress, in a pleasant mood.    SKIN: Carlin phototype II  - Focused examination of the face and scalp was performed.  - There is a pearly ulcerated plaque on the R cheek.  - There is a pearly papule on the L temple.  - There are scaly erythematous macules on the face and scalp x 11.  - No other lesions of concern on areas examined.     Past Medical History:   No past medical history on file.  Past Surgical History:   Procedure Laterality Date    APPENDECTOMY      as a teen     ARTHROPLASTY KNEE UNICOMPARTMENT  01/27/12    [facility]    COLONOSCOPY WITH CO2 INSUFFLATION N/A 7/15/2016    Procedure: COLONOSCOPY WITH CO2 INSUFFLATION;  Surgeon: Kathy Dickson MD;  Location: [facility] OR    HERNIA REPAIR Right 2003    right sided    HERNIA REPAIR Left        Social History:   reports that he has quit smoking. His smoking use included cigars. He has never used smokeless tobacco. He reports current alcohol use. He reports that he does not use drugs.    Family History:  Family History   Problem Relation Age of Onset    Heart Disease Father     Breast Cancer Sister     Asthma No family hx of     C.A.D. No family hx of     Diabetes No family hx of     Hypertension No family hx of     Cerebrovascular Disease No family hx of     Cancer - colorectal No family hx of     Prostate Cancer No family hx of     Alcohol/Drug No family hx of     Allergies No family hx of     Alzheimer Disease No family hx of     Anesthesia Reaction No family hx of     Arthritis No family hx of     Circulatory No family hx of     Congenital Anomalies No family hx of     Blood Disease No family hx of     Cancer No family hx of     Cardiovascular No family hx of     Connective Tissue Disorder No family hx of     Skin Cancer No family hx of        Medications:  Current Outpatient Medications   Medication Sig Dispense Refill    Omeprazole Magnesium (PRILOSEC OTC PO)        Allergies   Allergen Reactions    No Known Drug Allergy

## 2025-02-27 LAB
MUMPS ANTIBODY IGG INSTRUMENT VALUE: <5 AU/ML
MUV IGG SER QL IA: NORMAL

## 2025-03-02 DIAGNOSIS — C44.310 BCC (BASAL CELL CARCINOMA), FACE: Primary | ICD-10-CM

## 2025-03-02 LAB
PATH REPORT.COMMENTS IMP SPEC: ABNORMAL
PATH REPORT.COMMENTS IMP SPEC: ABNORMAL
PATH REPORT.COMMENTS IMP SPEC: YES
PATH REPORT.FINAL DX SPEC: ABNORMAL
PATH REPORT.GROSS SPEC: ABNORMAL
PATH REPORT.MICROSCOPIC SPEC OTHER STN: ABNORMAL
PATH REPORT.RELEVANT HX SPEC: ABNORMAL

## 2025-03-03 ENCOUNTER — ANCILLARY PROCEDURE (OUTPATIENT)
Dept: CT IMAGING | Facility: CLINIC | Age: 75
End: 2025-03-03
Attending: PHYSICIAN ASSISTANT
Payer: MEDICARE

## 2025-03-03 ENCOUNTER — TELEPHONE (OUTPATIENT)
Dept: FAMILY MEDICINE | Facility: CLINIC | Age: 75
End: 2025-03-03

## 2025-03-03 ENCOUNTER — OFFICE VISIT (OUTPATIENT)
Dept: PEDIATRICS | Facility: CLINIC | Age: 75
End: 2025-03-03
Payer: MEDICARE

## 2025-03-03 ENCOUNTER — MYC MEDICAL ADVICE (OUTPATIENT)
Dept: FAMILY MEDICINE | Facility: CLINIC | Age: 75
End: 2025-03-03

## 2025-03-03 VITALS
OXYGEN SATURATION: 97 % | TEMPERATURE: 98.2 F | HEART RATE: 70 BPM | DIASTOLIC BLOOD PRESSURE: 82 MMHG | SYSTOLIC BLOOD PRESSURE: 139 MMHG

## 2025-03-03 DIAGNOSIS — L53.9 FACIAL ERYTHEMA: ICD-10-CM

## 2025-03-03 DIAGNOSIS — L03.213 PERIORBITAL CELLULITIS, UNSPECIFIED LATERALITY: Primary | ICD-10-CM

## 2025-03-03 LAB
ALBUMIN SERPL BCG-MCNC: 4.1 G/DL (ref 3.5–5.2)
ALP SERPL-CCNC: 71 U/L (ref 40–150)
ALT SERPL W P-5'-P-CCNC: 17 U/L (ref 0–70)
ANION GAP SERPL CALCULATED.3IONS-SCNC: 11 MMOL/L (ref 7–15)
AST SERPL W P-5'-P-CCNC: 25 U/L (ref 0–45)
BASOPHILS # BLD AUTO: 0 10E3/UL (ref 0–0.2)
BASOPHILS NFR BLD AUTO: 0 %
BILIRUB SERPL-MCNC: 0.3 MG/DL
BUN SERPL-MCNC: 17.2 MG/DL (ref 8–23)
CALCIUM SERPL-MCNC: 9.7 MG/DL (ref 8.8–10.4)
CHLORIDE SERPL-SCNC: 101 MMOL/L (ref 98–107)
CREAT SERPL-MCNC: 1.29 MG/DL (ref 0.67–1.17)
EGFRCR SERPLBLD CKD-EPI 2021: 59 ML/MIN/1.73M2
EOSINOPHIL # BLD AUTO: 0.1 10E3/UL (ref 0–0.7)
EOSINOPHIL NFR BLD AUTO: 1 %
ERYTHROCYTE [DISTWIDTH] IN BLOOD BY AUTOMATED COUNT: 13.1 % (ref 10–15)
GLUCOSE SERPL-MCNC: 101 MG/DL (ref 70–99)
HCO3 SERPL-SCNC: 27 MMOL/L (ref 22–29)
HCT VFR BLD AUTO: 44.1 % (ref 40–53)
HGB BLD-MCNC: 14.6 G/DL (ref 13.3–17.7)
IMM GRANULOCYTES # BLD: 0 10E3/UL
IMM GRANULOCYTES NFR BLD: 0 %
LACTATE SERPL-SCNC: 1.3 MMOL/L (ref 0.7–2)
LYMPHOCYTES # BLD AUTO: 1.6 10E3/UL (ref 0.8–5.3)
LYMPHOCYTES NFR BLD AUTO: 18 %
MCH RBC QN AUTO: 31.1 PG (ref 26.5–33)
MCHC RBC AUTO-ENTMCNC: 33.1 G/DL (ref 31.5–36.5)
MCV RBC AUTO: 94 FL (ref 78–100)
MONOCYTES # BLD AUTO: 1.1 10E3/UL (ref 0–1.3)
MONOCYTES NFR BLD AUTO: 13 %
NEUTROPHILS # BLD AUTO: 5.7 10E3/UL (ref 1.6–8.3)
NEUTROPHILS NFR BLD AUTO: 67 %
NRBC # BLD AUTO: 0 10E3/UL
NRBC BLD AUTO-RTO: 0 /100
PLATELET # BLD AUTO: 282 10E3/UL (ref 150–450)
POTASSIUM SERPL-SCNC: 5.2 MMOL/L (ref 3.4–5.3)
PROT SERPL-MCNC: 7.6 G/DL (ref 6.4–8.3)
RBC # BLD AUTO: 4.7 10E6/UL (ref 4.4–5.9)
SODIUM SERPL-SCNC: 139 MMOL/L (ref 135–145)
WBC # BLD AUTO: 8.5 10E3/UL (ref 4–11)

## 2025-03-03 PROCEDURE — 99213 OFFICE O/P EST LOW 20 MIN: CPT | Performed by: PHYSICIAN ASSISTANT

## 2025-03-03 PROCEDURE — 87040 BLOOD CULTURE FOR BACTERIA: CPT | Performed by: PHYSICIAN ASSISTANT

## 2025-03-03 PROCEDURE — 70481 CT ORBIT/EAR/FOSSA W/DYE: CPT | Performed by: INTERNAL MEDICINE

## 2025-03-03 PROCEDURE — 83605 ASSAY OF LACTIC ACID: CPT | Performed by: PHYSICIAN ASSISTANT

## 2025-03-03 PROCEDURE — 85025 COMPLETE CBC W/AUTO DIFF WBC: CPT | Performed by: PHYSICIAN ASSISTANT

## 2025-03-03 PROCEDURE — 3079F DIAST BP 80-89 MM HG: CPT | Performed by: PHYSICIAN ASSISTANT

## 2025-03-03 PROCEDURE — 80053 COMPREHEN METABOLIC PANEL: CPT | Performed by: PHYSICIAN ASSISTANT

## 2025-03-03 PROCEDURE — 36415 COLL VENOUS BLD VENIPUNCTURE: CPT | Performed by: PHYSICIAN ASSISTANT

## 2025-03-03 PROCEDURE — 3075F SYST BP GE 130 - 139MM HG: CPT | Performed by: PHYSICIAN ASSISTANT

## 2025-03-03 RX ORDER — IOPAMIDOL 755 MG/ML
70 INJECTION, SOLUTION INTRAVASCULAR ONCE
Status: COMPLETED | OUTPATIENT
Start: 2025-03-03 | End: 2025-03-03

## 2025-03-03 RX ORDER — CLINDAMYCIN HYDROCHLORIDE 300 MG/1
CAPSULE ORAL
COMMUNITY
Start: 2025-03-02

## 2025-03-03 RX ADMIN — IOPAMIDOL 70 ML: 755 INJECTION, SOLUTION INTRAVASCULAR at 12:40

## 2025-03-03 ASSESSMENT — VISUAL ACUITY: OU: 1

## 2025-03-03 NOTE — PATIENT INSTRUCTIONS
You were seen and evaluated today for evaluation of facial erythema and swelling, workup is concerning for periorbital cellulitis acutely worsening over the past 24 hours.  Has been unresponsive to oral antibiotics.    Recommend immediate follow-up in the emergency department for further evaluation and management, feel that IV antibiotic's are necessary to treat the current concern.

## 2025-03-03 NOTE — PROGRESS NOTES
[facility] Clinic Visit    Assessment & Plan     (L03.213) Periorbital cellulitis, unspecified laterality  (primary encounter diagnosis)  Comment:   Plan: CT Orbits w Contrast, CBC with platelets         differential, Comprehensive metabolic panel,         Lactic acid whole blood, Blood Culture         Peripheral Blood          The patient is a 74-year-old male with a past medical history of GERD, tobacco use disorder high cholesterol who presents for evaluation of facial redness and itching, onset 3 days ago.  He previously had skin biopsies completed for a lesion on the right side of his face anterior to the right ear and a lesion on the left frontal area of the scalp.    A CT scan of the orbits with contrast showed soft tissue swelling with subcutaneous stranding involving the periorbital soft tissues of the face, concerning for periorbital cellulitis today.  The patient had been started on clindamycin 24 hours ago with no relief, and back facial swelling and redness has worsened over the past 24 hours.  CBC, lactate are within normal limits today.  CMP shows elevated creatinine, GFR 59, otherwise grossly normal.    Discussed with Dr. Tahir Nguyễn, who felt that the patient would benefit from further evaluation, should consider hospitalization and IV antibiotics.    Due to concern for periorbital cellulitis, acutely worsening over the past 24 hours and the likelihood of further progression to orbital cellulitis, recommended further evaluation in the emergency department.  Attempted to direct admit, but no beds available at [facility].    The patient had no severe headaches, ecchymosis, acute vision changes, pain with eye movements, or pain felt deep behind the eyes typical of orbital cellulitis.  However, given the potential for quick progression of infection for periorbital cellulitis and orbital swelling, recommended further prompt evaluation in the emergency department.   Patient and his wife verbalized understanding and transferred to [facility].  Called [facility] emergency department, discussed the patient with a staff provider.                No follow-ups on file.    Ivonne Bailon is a 74 year old, presenting for the following health issues:  Cellulitis (nose)    HPI      Rash  Onset/Duration: 2/28  Description  Location: Nose, spreading to the forehead and cheeks   Character: flakey, burning, red, itchy, & tight  Itching: mild  Intensity:  mild  Progression of Symptoms:  worsening and constant  Accompanying signs and symptoms:   Fever: YES  Body aches or joint pain: YES- body aches  Sore throat symptoms: No  Recent cold symptoms: No  History:           Previous episodes of similar rash: None  New exposures:  None  Recent travel: No  Exposure to similar rash: No  Precipitating or alleviating factors: No  Therapies tried and outcome: hydrocortisone cream -  Help stop the itching    The patient is a 74-year-old male with a past medical history of GERD, tobacco use disorder high cholesterol who presents for evaluation of facial redness and itching, onset 3 days ago.  He previously had skin biopsies completed for a lesion on the right side of his face anterior to the right ear and a lesion on the left frontal area of the scalp.  He initially noticed redness and swelling in the nose, swelling has gone down but redness has persisted.  He was seen and evaluated in urgent care yesterday, given a shot of Rocephin and started on oral clindamycin due to concern for facial cellulitis.  States that the redness was in the maxillary area last night but is now spread to the eyelids and forehead.  Feels like his eyelids are swollen and tight.  There is swelling and tightness in the face.  He has used hydrocortisone cream with limited relief.  Describes having chills last night but no chills or fever currently.  No pain with eye movement, no headaches currently,  no appreciable pain currently.  No peripheral vision loss, acute vision changes, neck stiffness or breathing concerns.  No other recent medications started other than the antibiotics clindamycin and Rocephin administered/started yesterday.  No previous history of anaphylaxis, angioedema or cellulitis of the face.      Review of Systems  Constitutional, neuro, ENT, endocrine, pulmonary, cardiac, gastrointestinal, genitourinary, musculoskeletal, integument and psychiatric systems are negative, except as otherwise noted.      Objective    There were no vitals taken for this visit.  There is no height or weight on file to calculate BMI.  Physical Exam  Vitals reviewed.   Constitutional:       General: He is not in acute distress.     Appearance: Normal appearance. He is not ill-appearing, toxic-appearing or diaphoretic.   HENT:      Head: Normocephalic and atraumatic. Right periorbital erythema and left periorbital erythema present.      Comments: Erythematous appearance of face over the maxillary prominence bilaterally, nose, periorbital area as well as forehead.  No tenderness to palpation.     Right Ear: Tympanic membrane, ear canal and external ear normal. There is no impacted cerumen.      Left Ear: Tympanic membrane, ear canal and external ear normal. There is no impacted cerumen.      Nose: Nose normal. No congestion or rhinorrhea.      Mouth/Throat:      Mouth: Mucous membranes are moist.      Pharynx: Oropharynx is clear. No oropharyngeal exudate or posterior oropharyngeal erythema.   Eyes:      General: Vision grossly intact. No visual field deficit.     Extraocular Movements:      Right eye: Normal extraocular motion and no nystagmus.      Left eye: Normal extraocular motion and no nystagmus.      Conjunctiva/sclera:      Right eye: Right conjunctiva is not injected. Chemosis present. No exudate or hemorrhage.     Left eye: Left conjunctiva is not injected. Chemosis present. No exudate or hemorrhage.      Pupils: Pupils are equal, round, and reactive to light. Pupils are equal.      Right eye: Pupil is round, reactive and not sluggish.      Left eye: Pupil is round, reactive and not sluggish.      Funduscopic exam:     Right eye: Red reflex present.         Left eye: Red reflex present.  Cardiovascular:      Rate and Rhythm: Normal rate and regular rhythm.      Pulses: Normal pulses.      Heart sounds: Normal heart sounds. No murmur heard.  Pulmonary:      Effort: Pulmonary effort is normal. No respiratory distress.      Breath sounds: Normal breath sounds. No stridor. No wheezing, rhonchi or rales.   Chest:      Chest wall: No tenderness.   Musculoskeletal:         General: Normal range of motion.      Cervical back: Normal range of motion and neck supple. No rigidity. No muscular tenderness.   Lymphadenopathy:      Cervical: No cervical adenopathy.   Skin:     General: Skin is warm and dry.   Neurological:      Mental Status: He is alert and oriented to person, place, and time.                    Signed Electronically by: Brandt Beckwith PA-C

## 2025-03-03 NOTE — TELEPHONE ENCOUNTER
RYLAN sent to [facility]      Wife at  requesting appointment for  who is at home. RN asked wife to Exam room 22 for   Wife had patient of facetime and RN able to ask questions and some evaluation  from phone.     Nurse Triage SBAR    Is this a 2nd Level Triage? YES, LICENSED PRACTITIONER REVIEW IS REQUIRED    Situation: Patient has redness swelling over nose and bilateral eyelids. Redness moving up approx. 1 inch into forehead.   Background: Seen in  3/2, IM and oral antibiotics and F/U with PCP.   Assessment: Patient able to open eyes without difficulty. Denies CP, SOB, vision changes, fever, chills, N&V, Biopsy site no redness, clean, intact.   Protocol Recommended Disposition:   Huddled with Provider and called [facility]. [facility] accepted and called patient.     Recommendation:  Be seen today and [facility] accepted.   Routed to provider    Does the patient meet one of the following criteria for [facility] visit consideration? 16+ years old, with an [facility] PCP     Alesha Marin RN  [facility] - Registered Nurse  Clinic Triage [place]   March 3, 2025

## 2025-03-04 ENCOUNTER — TELEPHONE (OUTPATIENT)
Dept: DERMATOLOGY | Facility: CLINIC | Age: 75
End: 2025-03-04

## 2025-03-04 NOTE — TELEPHONE ENCOUNTER
Called patient to schedule surgery with Dr. Rdz    Date of Surgery: 04/21    Surgery type: mohs    Consult scheduled: Yes    Has patient had mohs with us before? No    Additional comments: radha Hartley on 3/4/2025 at 12:11 PM

## 2025-03-06 LAB — BACTERIA BLD CULT: NORMAL

## 2025-03-08 LAB — BACTERIA BLD CULT: NO GROWTH

## 2025-04-02 ENCOUNTER — TELEPHONE (OUTPATIENT)
Dept: DERMATOLOGY | Facility: CLINIC | Age: 75
End: 2025-04-02
Payer: MEDICARE

## 2025-04-02 NOTE — TELEPHONE ENCOUNTER
Called and left  for patient to call clinic back to go over previsit before his procedure with us.       Shahana Bolton RN on 4/2/2025 at 11:23 AM

## 2025-04-04 NOTE — TELEPHONE ENCOUNTER
2nd attempt. Left voicemail for patient to call us back to discuss previsit.       Shahana Bolton RN on 4/4/2025 at 8:32 AM

## 2025-04-07 NOTE — TELEPHONE ENCOUNTER
Excision/Mohs previsit information                                                    Diagnosis: basal cell carcinoma  Site(s): R cheek, L temple    Is the surgical site below the waist?  NO  If yes, instruct the patient to purchase over the counter chlorhexidine surgical soap and wash all skin below the belly button twice before surgery    Allergies   Allergen Reactions    No Known Drug Allergy        Review and update allergy and medication list    Do you take the following medications:  Coumadin, Eliquis, Pradaxa, Xarelto:  NO.  If on Coumadin, INR should be checked within 7 days of surgery.  Range should be 3.5 or less or within therapeutic range.    Do you currently or have you previously had any of the following conditions:  Hepatitis:  NO  HIV/AIDS:  NO  Prolonged bleeding or bleeding disorder:  NO  Pacemaker: NO  Defibrillator:  NO  History of artificial or heart valve replacement:  NO  Endocarditis (inflammation of the inner lining of the heart's chambers and valves):  NO  Have you ever had a prosthetic joint infection:  NO  Pregnant or Breastfeeding:  NO  Mobility device (wheelchair, transfer difficulty): NO    Important Reminders:                                                      -For Mohs, notify patient they may be here through the lunch hour.  Be prepared to have down time and we recommend they bring a book or something to do.  -Ok to take all of their medications as prescribed  -Notify patient to eat prior to appointment.  The medication is more likely to cause you to feel jittery if you have an empty stomach.  -If face is being treated, please come with a make-up free face  -Avoid wearing earrings and necklaces  -If scalp is being treated, please come with clean hair free from product  -Patient will not be able to get the site wet for 48 hrs  -No submerging wound in standing water (lake, pool, bathtub, hot tub) for 2 weeks  -No physical activity for 48 hrs (further restrictions will be discussed  by MD at time of visit)    If any positives, send to RN for further review  Laurie Del Toro LPN

## 2025-04-07 NOTE — TELEPHONE ENCOUNTER
Talked with pt, confirmed meds and did the previsit. Pt did not have any other questions.    Laurie Del Toro LPN on 4/7/2025 at 8:35 AM

## 2025-04-16 ENCOUNTER — VIRTUAL VISIT (OUTPATIENT)
Dept: DERMATOLOGY | Facility: CLINIC | Age: 75
End: 2025-04-16
Payer: MEDICARE

## 2025-04-16 DIAGNOSIS — C44.319 BASAL CELL CARCINOMA OF LEFT TEMPLE REGION: Primary | ICD-10-CM

## 2025-04-16 DIAGNOSIS — C44.319 BASAL CELL CARCINOMA OF RIGHT CHEEK: ICD-10-CM

## 2025-04-16 RX ORDER — IBUPROFEN 200 MG
200 TABLET ORAL EVERY 6 HOURS PRN
COMMUNITY

## 2025-04-16 NOTE — PROGRESS NOTES
"Virtual Visit Details    Type of service: Telephone Visit   Phone call duration: 6 minutes   Originating Location (pt. Location): Home  Distant Location (provider location):  On-site  Telephone visit completed due to the patient did not have access to video, while the distant provider did.     Dermatologic Surgery Consultation Telemedicine Note   Encounter Date: Apr 16, 2025  Store-and-Forward and Telephone ([phone]). Location of teledermatologist: [facility].  Start time: 11:21 am  End time: 11:27 am    Dermatology Problem List:  Last skin check 2/26/25, recommended yearly.    1. Hx of NMSC  - nBCC, R cheek,  s/p bx 2/26/25, pending Mohs 4/21/25  - nBCC, L temple, s/p bx 2/26/25, pending Mohs 4/21/25  - sBCC, L sharp, s/p bx 2/21/19, efudex cream x6 weeks.   2. Actinic keratoses, diffuse  - s/p efudex 5% cream BID for 3-4 weeks to scalp/face, initiated 2/21/19  - s/p cryotherapy  3. BLK, left supraclavicular chest, s/p biopsy 2/21/19     Social history: Patient is retired from [employer]. Patient is an avid connolly and wood worker. Spends a lot of time outside with grandchildren. Tries to wear hat when outdoors.  Family history: Negative for skin cancer.       CC: No chief complaint on file.      Subjective: Uvaldo Ascencio is a 74 year old male who presents today for Mohs micrographic surgery consultation for a recent diagnosis of skin cancer.  - Skin cancer(s): BCC  - Location(s): right cheek and left temple  - no other concerns today         Objective:   Focused examination of the right cheek and left temple within the teledermatology photograph(s) on 2/26/2025 was performed.   - right cheek, there is erythematous papule with central ulceration  - left temple, there is erythematous papule          [id number] Path report:     A(1). Skin, R cheek: Basal cell carcinoma, nodular type   The specimen is received in formalin with proper patient identification, labeled \"right cheek\".  The specimen " "consists of a 1.2 x 0.9 x 0.3 cm gray-tan to white, crusty, pearly skin lesion.  The surgical margin is inked black.  The specimen is serially sectioned into 5 pieces, which results in 6 pieces.  The specimen is entirely submitted as A1.    B(2). Skin, L temple: Basal cell carcinoma, nodular type   The specimen is received in formalin with proper patient identification, labeled \"left temple\".  The specimen consists of a 0.7 x 0.6 cm gray-white to tan, shiny, pearly skin shave biopsy.  The surgical margin is inked black.  The specimen is trisected, entirely submitted as B1.     Assessment and Plan:     1. Plan for Mohs micrographic surgery for skin cancer(s) above:  *Review lab result(s): Dermpath report   - We discussed the nature of the diagnosis/condition above. We discussed the treatment options, including the risks benefits and expectations of these options. We recommend micrographic surgery as the most effective and most tissue sparing option for treatment, and the patient agrees to proceed with this.  The patient is aware of the risks, benefits and expectations of this procedure. The patient will be scheduled for this procedure, if not already done so.  - We anticipate the following closure type for the right cheek: Sliding or lifting flap vs linear closure  - We anticipate the following closure type for the left temple: Linear closure, such as complex linear closure (CLC)    Patient was discussed with and evaluated by attending physician Noe Rdz DO.    Scribe Disclosure:   I, Deana Gregg, am serving as a scribe; to document services personally performed by Dr. Noe Rdz - -based on data collection and the provider's statements to me.     Staff Physician Comments:   I saw and evaluated the patient with the physician assistant (Nuria Jaeger PA-C) and I agree with the assessment and plan and the above description of the procedure. I was present for the key portions of the procedure and entire " exam.    Noe Rdz DO    Department of Dermatology  [facility]: Phone: [phone], Fax:[phone]  [facility]: Phone: [phone], Fax: [phone]

## 2025-04-16 NOTE — NURSING NOTE
Uvaldo Ascencio's chief complaint for this visit includes:  Chief Complaint   Patient presents with    Consult     Mohs consult- nBCC R cheek and left temple. Checklist complete. Mohs 4/21     PCP: Kathy Dickson    Referring Provider:  Kathy Dickson MD  [address] [place] [address]    There were no vitals taken for this visit.  Data Unavailable        Allergies   Allergen Reactions    No Known Drug Allergy          Do you need any medication refills at today's visit?  No.        Teledermatology Nurse Call Patients:     Are you in the state [facility] at the time of the encounter? yes    Today's visit will be billed to you and your insurance.    A teledermatology visit is not as thorough as an in-person visit and the quality of the photograph sent may not be of the same quality as that taken by the dermatology clinic.       Shahana Bolton RN on 4/16/2025 at 11:13 AM

## 2025-04-16 NOTE — LETTER
4/16/2025      Uvaldo Ascencio  [address] [place] [address]      Dear Colleague,    Thank you for referring your patient, Uvaldo Ascencio, to the [facility]. Please see a copy of my visit note below.    Virtual Visit Details    Type of service: Telephone Visit   Phone call duration: 6 minutes   Originating Location (pt. Location): Home  Distant Location (provider location):  On-site  Telephone visit completed due to the patient did not have access to video, while the distant provider did.     Dermatologic Surgery Consultation Telemedicine Note   Encounter Date: Apr 16, 2025  Store-and-Forward and Telephone ([phone]). Location of teledermatologist: [facility].  Start time: 11:21 am  End time: 11:27 am    Dermatology Problem List:  Last skin check 2/26/25, recommended yearly.    1. Hx of NMSC  - nBCC, R cheek,  s/p bx 2/26/25, pending Mohs 4/21/25  - nBCC, L temple, s/p bx 2/26/25, pending Mohs 4/21/25  - sBCC, L sharp, s/p bx 2/21/19, efudex cream x6 weeks.   2. Actinic keratoses, diffuse  - s/p efudex 5% cream BID for 3-4 weeks to scalp/face, initiated 2/21/19  - s/p cryotherapy  3. BLK, left supraclavicular chest, s/p biopsy 2/21/19     Social history: Patient is retired from [employer]. 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 Spends a lot of time outside with grandchildren. Tries to wear hat when outdoors.  Family history: Negative for skin cancer.       CC: No chief complaint on file.      Subjective: Uvaldo Ascencio is a 74 year old male who presents today for Mohs micrographic surgery consultation for a recent diagnosis of skin cancer.  - Skin cancer(s): BCC  - Location(s): right cheek and left temple  - no other concerns today         Objective:   Focused examination of the right cheek and left temple within the teledermatology photograph(s) on 2/26/2025 was performed.   - right cheek, there is erythematous papule with central ulceration  - left  "temple, there is erythematous papule          [id number] Path report:     A(1). Skin, R cheek: Basal cell carcinoma, nodular type   The specimen is received in formalin with proper patient identification, labeled \"right cheek\".  The specimen consists of a 1.2 x 0.9 x 0.3 cm gray-tan to white, crusty, pearly skin lesion.  The surgical margin is inked black.  The specimen is serially sectioned into 5 pieces, which results in 6 pieces.  The specimen is entirely submitted as A1.    B(2). Skin, L temple: Basal cell carcinoma, nodular type   The specimen is received in formalin with proper patient identification, labeled \"left temple\".  The specimen consists of a 0.7 x 0.6 cm gray-white to tan, shiny, pearly skin shave biopsy.  The surgical margin is inked black.  The specimen is trisected, entirely submitted as B1.     Assessment and Plan:     1. Plan for Mohs micrographic surgery for skin cancer(s) above:  *Review lab result(s): Dermpath report   - We discussed the nature of the diagnosis/condition above. We discussed the treatment options, including the risks benefits and expectations of these options. We recommend micrographic surgery as the most effective and most tissue sparing option for treatment, and the patient agrees to proceed with this.  The patient is aware of the risks, benefits and expectations of this procedure. The patient will be scheduled for this procedure, if not already done so.  - We anticipate the following closure type for the right cheek: Sliding or lifting flap vs linear closure  - We anticipate the following closure type for the left temple: Linear closure, such as complex linear closure (CLC)    Patient was discussed with and evaluated by attending physician Noe Rdz DO.    Scribe Disclosure:   Deana MCMILLAN, am serving as a scribe; to document services personally performed by Dr. Noe Rdz - -based on data collection and the provider's statements to me.     Staff Physician " Comments:   I saw and evaluated the patient with the physician assistant (Nuria Jaeger PA-C) and I agree with the assessment and plan and the above description of the procedure. I was present for the key portions of the procedure and entire exam.    Noe Rdz DO    Department of Dermatology  [facility]: Phone: [phone], Fax:[phone]  [facility]: Phone: [phone], Fax: [phone]        Again, thank you for allowing me to participate in the care of your patient.        Sincerely,        Noe Rdz MD    Electronically signed

## 2025-04-21 ENCOUNTER — OFFICE VISIT (OUTPATIENT)
Dept: DERMATOLOGY | Facility: CLINIC | Age: 75
End: 2025-04-21
Attending: DERMATOLOGY
Payer: MEDICARE

## 2025-04-21 VITALS — OXYGEN SATURATION: 99 % | DIASTOLIC BLOOD PRESSURE: 92 MMHG | SYSTOLIC BLOOD PRESSURE: 182 MMHG | HEART RATE: 77 BPM

## 2025-04-21 DIAGNOSIS — C44.319 BASAL CELL CARCINOMA (BCC) OF LEFT TEMPLE REGION: ICD-10-CM

## 2025-04-21 DIAGNOSIS — C44.319 BASAL CELL CARCINOMA (BCC) OF RIGHT CHEEK: Primary | ICD-10-CM

## 2025-04-21 PROCEDURE — 3080F DIAST BP >= 90 MM HG: CPT | Performed by: DERMATOLOGY

## 2025-04-21 PROCEDURE — 17311 MOHS 1 STAGE H/N/HF/G: CPT | Mod: GC | Performed by: DERMATOLOGY

## 2025-04-21 PROCEDURE — 3077F SYST BP >= 140 MM HG: CPT | Performed by: DERMATOLOGY

## 2025-04-21 PROCEDURE — 12054 INTMD RPR FACE/MM 7.6-12.5CM: CPT | Mod: GC | Performed by: DERMATOLOGY

## 2025-04-21 PROCEDURE — 17312 MOHS ADDL STAGE: CPT | Mod: GC | Performed by: DERMATOLOGY

## 2025-04-21 PROCEDURE — 17311 MOHS 1 STAGE H/N/HF/G: CPT | Mod: 59 | Performed by: DERMATOLOGY

## 2025-04-21 NOTE — NURSING NOTE
Uvaldo Ascencio's goals for this visit include:   Chief Complaint   Patient presents with    Procedure     nBCC R cheek and DAKOTA taylor       He requests these members of his care team be copied on today's visit information:     PCP: Kathy Dickson    Referring Provider:  Edmundo Garnica MD  [address] [place] [id number]    BP (!) 182/92   Pulse 77   SpO2 99%     Do you need any medication refills at today's visit?       Karen Pink EMT

## 2025-04-21 NOTE — PATIENT INSTRUCTIONS
Caring for your skin after surgery    For the first 48 hours after your surgery:  Leave the pressure dressing on and keep it dry. If it should come loose, you may re-tape it, but do not take it off.  Relax and take it easy.  Do not do any vigorous exercise, heavy lifting or bending forward. This could cause the wound to bleed.  If the wound is on your head, sleeping with your head elevated for the first few nights will help with swelling and bleeding. (Use linens/pillow cases that would be ok to get blood on in the event there is some oozing from the bandage).  Post-operative pain is usually mild.  You may alternate between 1000 mg of Tylenol (acetaminophen) and 400 mg of Ibuprofen every 4 hours.  This means, for example, that you could take the followin,000 mg of Tylenol, followed 4 hours later by 400 mg Ibuprofen, followed 4 hours later with 1,000 mg of Tylenol, and so forth.  Do not take more than 4,000 mg of acetaminophen in a 24-hour period or 3200 mg of Ibuprofen in a 24-hr period.    Avoid alcohol and vitamin E as these may increase your tendency to bleed.  Apply an ice pack for 20 min every 2-3 hours while awake.  This may help reduce swelling, bruising, and pain.  Make sure the ice pack is waterproof so that the pressure bandage doesn't get wet.  You may see a small amount of drainage or blood on your pressure bandage. This is normal. However:  If drainage or bleeding continues or saturates the bandage, you will need to apply firm pressure over the bandage with a clean washcloth for 15 minutes.    Remove the saturated bandage.  If bleeding has stopped, apply Vaseline over the suture line and cover with a non-stick bandage.  To add some pressure over the wound, fold a piece of gauze and tape over the area.  If bleeding continues after applying pressure for 15 minutes, apply an ice pack with gentle pressure to the bandaged area for another 15 minutes.  If bleeding still continues, call our office or go to  the nearest emergency room.    48 Hours After Surgery:  Remove the bandage.  If it seems sticky or too difficult to get off, you may need to soak it off in the shower.  Wash wound with a mild soap and water.  Use caution when washing the wound, be gentle and do not let the forceful shower stream hit the wound directly.  Pat dry.  Apply Vaseline or Aquaphor ointment (from a new container or tube) over the suture line with a Q-tip.  Cover the site with a bandage.  Do this daily until the sutures have been removed or dissolved.    What to expect:  The first couple of days your wound may be tender and may bleed slightly when doing wound care.  There may be swelling and bruising around the wound, especially if it is near the eyes. For your comfort, you may apply ice or cold compresses to the area.  The area around your wound may be numb for several weeks or even months.  You may experience periodic sharp pain or mild itching around the wound as it heals.    Call Us If:  You have bleeding that will not stop after applying pressure and ice.  You have pain that is not controlled with Tylenol and Ibuprofen.  You have signs or symptoms of an infection such as fever over 100 degrees Fahrenheit, redness, swelling, or warmth spreading from the wound, increasing pain after the first 48 hours, or white/yellow/green drainage from the wound (may or may not have a foul odor).    [facility], Dermatology [phone].  Available M- 8-5.  For urgent needs outside of business hours, call the [facility] at [phone] and ask to speak with/page the dermatology resident on call.

## 2025-04-21 NOTE — PROGRESS NOTES
The following medication was given:     MEDICATION:  Lidocaine with epinephrine 1% [id number]  ROUTE: SQ  SITE: see procedure note  DOSE: 13.5 mL  LOT #: [id number]  : Pfizer  EXPIRATION DATE: 11-  NDC#: 1220-2358-19  Was there drug waste? Yes 1.5 mL  Multi-dose vial: Yes    Shahana Bolton RN  April 21, 2025     Vaseline and pressure dressing applied to Mohs site on left temple.  Wound care instructions reviewed with patient and AVS provided.  Patient verbalized understanding.  Post op appointment scheduled No. Patient will follow up for suture removal: N/A.  No further questions or concerns at this time.     Vaseline and pressure dressing applied to Mohs site on right cheek.  Wound care instructions reviewed with patient and AVS provided.  Patient verbalized understanding.  Post op appointment scheduled No. Patient will follow up for suture removal: N/A.  No further questions or concerns at this time.

## 2025-04-21 NOTE — PROGRESS NOTES
[facility] Dermatologic Surgery Clinic [place] Procedure Note    Dermatology Problem List:  Last skin check 2/26/25, recommended yearly.     1. Hx of NMSC  - nBCC, R cheek,  s/p Mohs 4/21/25  - nBCC, L temple, s/p Mohs 4/21/25  - sBCC, L sharp, s/p bx 2/21/19, efudex cream x6 weeks.   2. Actinic keratoses, diffuse  - s/p efudex 5% cream BID for 3-4 weeks to scalp/face, initiated 2/21/19  - s/p cryotherapy  3. BLK, left supraclavicular chest, s/p biopsy 2/21/19     Social history: Patient is retired from [employer]. Patient is an avid connolly and wood worker. Spends a lot of time outside with grandchildren. Tries to wear hat when outdoors.  Family history: Negative for skin cancer.  ________________________________________________________________    Date of Service:  Apr 21, 2025  Surgery: Mohs micrographic surgery    Case 1  Repair Type: intermediate  Repair Size: 6.7 cm  Suture Material: 4-0 mononcryl, 5-0 express gut  Tumor Type: BCC - Basal cell carcinoma  Location: right cheek  Derm-Path Accession #: [id number]  PreOp Size: 1.8x1.3 cm  PostOp Size: 2.8x2.5 cm  Mohs Accession #: [id number]  Level of Defect: fascia      Procedure:  We discussed the principles of treatment and most likely complications including scarring, bleeding, infection, swelling, pain, crusting, nerve damage, large wound,  incomplete excision, wound dehiscence,  nerve damage, recurrence, and a second procedure may be recommended to obtain the best cosmetic or functional result.    Informed consent was obtained and the patient underwent the procedure as follows:  The patient was placed supine on the operating table.  The cancer was identified, outlined with a marker, and verified by the patient.  The entire surgical field was prepped with ChoraPrep.  The surgical site was anesthetized using Lidocaine 1% with epi 1:100,000.      The area of clinically apparent tumor was debulked. The layer of tissue was then surgically excised using a #15  blade and was then transferred onto a specimen sheet maintaining the orientation of the specimen. Hemostasis was obtained using bipolar electrocoagulation. The wound site was then covered with a dressing while the tissue samples were processed for examination.    The excised tissue was transported to the Mohs histology laboratory maintaining the tissue orientation.  The tissue specimen was relaxed so that the entire surgical margin was in a a single horizontal plane for sectioning and inked for precise mapping.  A precise reference map was drawn to reflect the sectioning of the specimen, colored inking of the margins, and orientation on the patient.  The tissue was processed using horizontal sectioning of the base and continuous peripheral margins.  The histopathologic sections were reviewed in conjunction with the reference map.    Total blocks: 1    Total slides:  2    Residual tumor was identified and indicated in red on the reference map, identifying the location where further tissue excision was necessary. Therefore, an additional stage of Mohs Micrographic surgery was deemed necessary.     Stage II   The patient was returned to the operating room, and the area prepped in the usual manner. The residual tumor was excised using the reference map as a guide. The specimen was transfered to a labeled specimen sheet maintaining the orientation of the specimen. Hemostasis was obtained and the wound site was covered with a dressing while the tissue was processed for examination.     The excised [facility] maintaining orientation. The specimen margins were inked for precise mapping and a reference map was prepared for the is additional stage to maintain precise orientation as described above. The tissue was processed using horizontal sectioning of the base and continuous peripheral margins. The histopathologic sections were reviewed in conjunction with the reference map.      Total blocks: 1    Total slides:  1    There were no cancer cells visualized on examination, therefore Mohs surgery was complete.     Reconstruction: Intermediate Linear Closure      The patient was taken to the operative suite and placed supine on the operating room table. The defect was identified. Appropriate markings were made with a marking pen to plan the repair. The area was infiltrated with Lidocaine 1% with epi 1:100,000 and prepped with ChoraPrep and draped with sterile towels.     The wound was debeveled and undermined widely. Cones were excised within relaxed skin tension lines on both sides of the defect. Hemostasis was obtained using bipolar electrocoagulation. The deeper layers of subcutaneous and superficial (nonmuscle) fascia tissues were then approximated using monocryl 4-0 buried vertical mattress sutures (deep layer suturing). The wound edges were then approximated; additional buried sutures were placed in a similar fashion where needed. other (comments) (5-0 express gut) simple running (superficial layer suturing) were carefully placed for maximum eversion and meticulous approximation.    Estimated blood loss was less than 10 ml for all surgical sites. The wound was cleansed with saline and ointment was applied along the wound surface. A sterile pressure dressing was applied and wound care instructions were given verbally and in writing. Patient was informed that additional refinement of the resulting surgical scar may be used as a second stage of this reconstruction. The patient was discharged from the Dermatologic Surgery Center alert and ambulatory.    Repair Size: 6.7 cm  Sutures Used:  Deep: monocryl 4-0 Superficial: other (comments) (5-0 express gut)     Dr. Noe Rdz was physically present  for the entire procedure, key portions of the reconstruction and always immediately available.     [facility] Dermatologic Surgery [facility] [place] Procedure Note      Date of  Service:  Apr 21, 2025  Surgery: Mohs micrographic surgery    Case 2  Repair Type: intermediate  Repair Size: 3.0 cm  Suture Material: 4-0 monocryl, 5-0 express gut  Tumor Type: BCC - Basal cell carcinoma  Location: left temple  Derm-Path Accession #: [id number]  PreOp Size: 1.1x0.8 cm  PostOp Size: 1.5x1.1 cm  Mohs Accession #: [id number]  Level of Defect: fascia      Procedure:  We discussed the principles of treatment and most likely complications including scarring, bleeding, infection, swelling, pain, crusting, nerve damage, large wound,  incomplete excision, wound dehiscence,  nerve damage, recurrence, and a second procedure may be recommended to obtain the best cosmetic or functional result.    Informed consent was obtained and the patient underwent the procedure as follows:  The patient was placed supine on the operating table.  The cancer was identified, outlined with a marker, and verified by the patient.  The entire surgical field was prepped with ChoraPrep.  The surgical site was anesthetized using Lidocaine 1% with epi 1:100,000.      The area of clinically apparent tumor was debulked. The layer of tissue was then surgically excised using a #15 blade and was then transferred onto a specimen sheet maintaining the orientation of the specimen. Hemostasis was obtained using bipolar electrocoagulation. The wound site was then covered with a dressing while the tissue samples were processed for examination.    The excised tissue was transported to the Mohs histology laboratory maintaining the tissue orientation.  The tissue specimen was relaxed so that the entire surgical margin was in a a single horizontal plane for sectioning and inked for precise mapping.  A precise reference map was drawn to reflect the sectioning of the specimen, colored inking of the margins, and orientation on the patient.  The tissue was processed using horizontal sectioning of the base and continuous peripheral margins.  The  histopathologic sections were reviewed in conjunction with the reference map.    Total blocks: 1    Total slides:  1    There were no cancer cells visualized on examination, therefore Mohs surgery was complete.    Reconstruction: Intermediate Linear Closure    The patient was taken to the operative suite and placed supine on the operating room table. The defect was identified.  Appropriate markings were made with a marking pen to plan the repair. The area was infiltrated with Lidocaine 1% with epi 1:100,000 and prepped with ChoraPrep and draped with sterile towels.     The wound was debeveled and undermined widely. Cones were excised within relaxed skin tension lines on both sides of the defect. Hemostasis was obtained using bipolar electrocoagulation.  The deeper layers of subcutaneous and superficial (nonmuscle) fascia tissues were then approximated using monocryl 4-0 buried vertical mattress sutures (deep layer suturing). The wound edges were then approximated; additional buried sutures were placed in a similar fashion where needed. other (comments) (5-0 express gut) simple running sutures (superficial layer suturing) were carefully placed for maximum eversion and meticulous approximation.      Repair Size: 3.0 cm  Sutures Used:  Deep: monocryl 4-0 Superficial: other (comments) (5-0 express gut)    The wound was cleansed with saline and ointment was applied along the wound surface.     A sterile pressure dressing was applied. Wound care instructions were given verbally and in writing. The patient left the operating suite in stable condition.  Patient was informed that additional refinement of the resulting surgical scar may be used as a second stage of this reconstruction.     Dr. Noe Rdz MD was physically present  for the entire procedure, key portions of the reconstruction and always immediately available.     Staff Involved:  Scribe/Staff    Scribe Disclosure:   Deana MCMILLAN, am serving as a  scribe; to document services personally performed by Dr. Noe Rdz - -based on data collection and the provider's statements to me.     Staff attestation:  The documentation recorded by the scribe accurately reflects the services I personally performed and the decisions I personally made. I have made edits where needed.    Electronically signed by:     Deana Cantu MD  Mohs Micrographic Surgery and Dermatologic Oncology Fellow  [facility]    Staff Physician Comments:   I saw and evaluated the patient with the Mohs Surgery Fellow (Dr. Deana Cantu) and I agree with the assessment and plan and the above description of the procedure as documented by the scribe. I was present for the key portions of the procedure and entire exam. I was immediately available in the clinic throughout the procedures.     Noe Rdz DO    Department of Dermatology  [facility] Clin[facility]: Phone: [phone], Fax:[phone]  [facility]: Phone: [phone], Fax: [phone]    Care and Laboratory Testing Performed at:  [facility]   Dermatology Clinic  [address] [place] [address]

## 2025-04-21 NOTE — LETTER
4/21/2025      Uvaldo Ascencio  [address]  [place] [address]      Dear Colleague,    Thank you for referring your patient, Uvaldo Ascencio, to the [facility]. Please see a copy of my visit note below.    The following medication was given:     MEDICATION:  Lidocaine with epinephrine 1% 1:940020  ROUTE: SQ  SITE: see procedure note  DOSE: 13.5 mL  LOT #: [id number]  : [employer]  EXPIRATION DATE: 11-  NDC#: 7282-2031-92  Was there drug waste? Yes 1.5 mL  Multi-dose vial: Yes    Shahana Bolton RN  April 21, 2025     Vaseline and pressure dressing applied to Mohs site on left temple.  Wound care instructions reviewed with patient and AVS provided.  Patient verbalized understanding.  Post op appointment scheduled No. Patient will follow up for suture removal: N/A.  No further questions or concerns at this time.     Vaseline and pressure dressing applied to Mohs site on right cheek.  Wound care instructions reviewed with patient and AVS provided.  Patient verbalized understanding.  Post op appointment scheduled No. Patient will follow up for suture removal: N/A.  No further questions or concerns at this time.      [facility] Dermatologic Surgery Clinic [place] Procedure Note    Dermatology Problem List:  Last skin check 2/26/25, recommended yearly.     1. Hx of NMSC  - nBCC, R cheek,  s/p Mohs 4/21/25  - nBCC, L temple, s/p Mohs 4/21/25  - sBCC, L sharp, s/p bx 2/21/19, efudex cream x6 weeks.   2. Actinic keratoses, diffuse  - s/p efudex 5% cream BID for 3-4 weeks to scalp/face, initiated 2/21/19  - s/p cryotherapy  3. BLK, left supraclavicular chest, s/p biopsy 2/21/19     Social history: Patient is retired from [employer]. Patient is an avid connolly and wood worker. Spends a lot of time outside with grandchildren. Tries to wear hat when outdoors.  Family history: Negative for skin cancer.  ________________________________________________________________    Date of  Service:  Apr 21, 2025  Surgery: Mohs micrographic surgery    Case 1  Repair Type: intermediate  Repair Size: 6.7 cm  Suture Material: 4-0 mononcryl, 5-0 express gut  Tumor Type: BCC - Basal cell carcinoma  Location: right cheek  Derm-Path Accession #: [id number]  PreOp Size: 1.8x1.3 cm  PostOp Size: 2.8x2.5 cm  Mohs Accession #: [id number]  Level of Defect: fascia      Procedure:  We discussed the principles of treatment and most likely complications including scarring, bleeding, infection, swelling, pain, crusting, nerve damage, large wound,  incomplete excision, wound dehiscence,  nerve damage, recurrence, and a second procedure may be recommended to obtain the best cosmetic or functional result.    Informed consent was obtained and the patient underwent the procedure as follows:  The patient was placed supine on the operating table.  The cancer was identified, outlined with a marker, and verified by the patient.  The entire surgical field was prepped with ChoraPrep.  The surgical site was anesthetized using Lidocaine 1% with epi 1:100,000.      The area of clinically apparent tumor was debulked. The layer of tissue was then surgically excised using a #15 blade and was then transferred onto a specimen sheet maintaining the orientation of the specimen. Hemostasis was obtained using bipolar electrocoagulation. The wound site was then covered with a dressing while the tissue samples were processed for examination.    The excised tissue was transported to the Mohs histology laboratory maintaining the tissue orientation.  The tissue specimen was relaxed so that the entire surgical margin was in a a single horizontal plane for sectioning and inked for precise mapping.  A precise reference map was drawn to reflect the sectioning of the specimen, colored inking of the margins, and orientation on the patient.  The tissue was processed using horizontal sectioning of the base and continuous peripheral margins.  The  histopathologic sections were reviewed in conjunction with the reference map.    Total blocks: 1    Total slides:  2    Residual tumor was identified and indicated in red on the reference map, identifying the location where further tissue excision was necessary. Therefore, an additional stage of Mohs Micrographic surgery was deemed necessary.     Stage II   The patient was returned to the operating room, and the area prepped in the usual manner. The residual tumor was excised using the reference map as a guide. The specimen was transfered to a labeled specimen sheet maintaining the orientation of the specimen. Hemostasis was obtained and the wound site was covered with a dressing while the tissue was processed for examination.     The excised tissue was transported to the Mohs histology laboratory maintaining orientation. The specimen margins were inked for precise mapping and a reference map was prepared for the is additional stage to maintain precise orientation as described above. The tissue was processed using horizontal sectioning of the base and continuous peripheral margins. The histopathologic sections were reviewed in conjunction with the reference map.     Total blocks: 1    Total slides:  1    There were no cancer cells visualized on examination, therefore Mohs surgery was complete.     Reconstruction: Intermediate Linear Closure      The patient was taken to the operative suite and placed supine on the operating room table. The defect was identified. Appropriate markings were made with a marking pen to plan the repair. The area was infiltrated with Lidocaine 1% with epi 1:100,000 and prepped with ChoraPrep and draped with sterile towels.     The wound was debeveled and undermined widely. Cones were excised within relaxed skin tension lines on both sides of the defect. Hemostasis was obtained using bipolar electrocoagulation. The deeper layers of subcutaneous and superficial (nonmuscle) fascia tissues  were then approximated using monocryl 4-0 buried vertical mattress sutures (deep layer suturing). The wound edges were then approximated; additional buried sutures were placed in a similar fashion where needed. other (comments) (5-0 express gut) simple running (superficial layer suturing) were carefully placed for maximum eversion and meticulous approximation.    Estimated blood loss was less than 10 ml for all surgical sites. The wound was cleansed with saline and ointment was applied along the wound surface. A sterile pressure dressing was applied and wound care instructions were given verbally and in writing. Patient was informed that additional refinement of the resulting surgical scar may be used as a second stage of this reconstruction. The patient was discharged from the Dermatologic Surgery Center alert and ambulatory.    Repair Size: 6.7 cm  Sutures Used:  Deep: monocryl 4-0 Superficial: other (comments) (5-0 express gut)     Dr. Noe Rdz was physically present  for the entire procedure, key portions of the reconstruction and always immediately available.     [facility] Dermatologic Surgery Clinic [place] Procedure Note      Date of Service:  Apr 21, 2025  Surgery: Mohs micrographic surgery    Case 2  Repair Type: intermediate  Repair Size: 3.0 cm  Suture Material: 4-0 monocryl, 5-0 express gut  Tumor Type: BCC - Basal cell carcinoma  Location: left temple  Derm-Path Accession #: [id number]  PreOp Size: 1.1x0.8 cm  PostOp Size: 1.5x1.1 cm  Mohs Accession #: [id number]  Level of Defect: fascia      Procedure:  We discussed the principles of treatment and most likely complications including scarring, bleeding, infection, swelling, pain, crusting, nerve damage, large wound,  incomplete excision, wound dehiscence,  nerve damage, recurrence, and a second procedure may be recommended to obtain the best cosmetic or functional result.    Informed consent was obtained and the patient underwent the  procedure as follows:  The patient was placed supine on the operating table.  The cancer was identified, outlined with a marker, and verified by the patient.  The entire surgical field was prepped with ChoraPrep.  The surgical site was anesthetized using Lidocaine 1% with epi 1:100,000.      The area of clinically apparent tumor was debulked. The layer of tissue was then surgically excised using a #15 blade and was then transferred onto a specimen sheet maintaining the orientation of the specimen. Hemostasis was obtained using bipolar electrocoagulation. The wound site was then covered with a dressing while the tissue samples were processed for examination.    The excised tissue was transported to the Mohs histology laboratory maintaining the tissue orientation.  The tissue specimen was relaxed so that the entire surgical margin was in a a single horizontal plane for sectioning and inked for precise mapping.  A precise reference map was drawn to reflect the sectioning of the specimen, colored inking of the margins, and orientation on the patient.  The tissue was processed using horizontal sectioning of the base and continuous peripheral margins.  The histopathologic sections were reviewed in conjunction with the reference map.    Total blocks: 1    Total slides:  1    There were no cancer cells visualized on examination, therefore Mohs surgery was complete.    Reconstruction: Intermediate Linear Closure    The patient was taken to the operative suite and placed supine on the operating room table. The defect was identified.  Appropriate markings were made with a marking pen to plan the repair. The area was infiltrated with Lidocaine 1% with epi 1:100,000 and prepped with ChoraPrep and draped with sterile towels.     The wound was debeveled and undermined widely. Cones were excised within relaxed skin tension lines on both sides of the defect. Hemostasis was obtained using bipolar electrocoagulation.  The deeper  layers of subcutaneous and superficial (nonmuscle) fascia tissues were then approximated using monocryl 4-0 buried vertical mattress sutures (deep layer suturing). The wound edges were then approximated; additional buried sutures were placed in a similar fashion where needed. other (comments) (5-0 express gut) simple running sutures (superficial layer suturing) were carefully placed for maximum eversion and meticulous approximation.      Repair Size: 3.0 cm  Sutures Used:  Deep: monocryl 4-0 Superficial: other (comments) (5-0 express gut)    The wound was cleansed with saline and ointment was applied along the wound surface.     A sterile pressure dressing was applied. Wound care instructions were given verbally and in writing. The patient left the operating suite in stable condition.  Patient was informed that additional refinement of the resulting surgical scar may be used as a second stage of this reconstruction.     Dr. Noe Rdz MD was physically present  for the entire procedure, key portions of the reconstruction and always immediately available.     Staff Involved:  Scribe/Staff    Scribe Disclosure:   I, Deana Gregg, am serving as a scribe; to document services personally performed by Dr. Noe Rdz - -based on data collection and the provider's statements to me.     Staff attestation:  The documentation recorded by the scribe accurately reflects the services I personally performed and the decisions I personally made. I have made edits where needed.    Electronically signed by:     Deana Cantu MD  Mohs Micrographic Surgery and Dermatologic Oncology Fellow  [facility]    Staff Physician Comments:   I saw and evaluated the patient with the Mohs Surgery Fellow (Dr. Deana Cantu) and I agree with the assessment and plan and the above description of the procedure as documented by the scribe. I was present for the key portions of the procedure and entire exam. I was immediately available in  the clinic throughout the procedures.     Noe Rdz DO    Department of Dermatology  [facility] Clinics: Phone: [phone], Fax:[phone]  [facility] [place]: Phone: [phone], Fax: [phone]    Care and Laboratory Testing Performed at:  [facility]   Dermatology Clinic  [address] [place] [address]      Again, thank you for allowing me to participate in the care of your patient.        Sincerely,        Noe Rdz MD    Electronically signed

## 2025-04-22 ENCOUNTER — TELEPHONE (OUTPATIENT)
Dept: DERMATOLOGY | Facility: CLINIC | Age: 75
End: 2025-04-22
Payer: MEDICARE

## 2025-04-22 NOTE — TELEPHONE ENCOUNTER
Uvaldo is 1 day s/p Mohs on right cheek and left temple.    What are you doing to manage your pain?  Uvaldo denies pain.    Have you had any noticeable bleeding through the bandage?  No, dressing is dry and intact.    Do you have any concerns?  No     Wound care directions reviewed.  Patient declined a post op appointment.  Next skin check has been scheduled.     Nataliia Dockery RN

## 2025-04-23 ENCOUNTER — OFFICE VISIT (OUTPATIENT)
Dept: DERMATOLOGY | Facility: CLINIC | Age: 75
End: 2025-04-23
Payer: MEDICARE

## 2025-04-23 DIAGNOSIS — Z51.89 VISIT FOR WOUND CHECK: ICD-10-CM

## 2025-04-23 DIAGNOSIS — L57.0 ACTINIC KERATOSIS: ICD-10-CM

## 2025-04-23 DIAGNOSIS — D18.01 CHERRY ANGIOMA: ICD-10-CM

## 2025-04-23 DIAGNOSIS — Z12.83 SCREENING EXAM FOR SKIN CANCER: ICD-10-CM

## 2025-04-23 DIAGNOSIS — D22.9 MULTIPLE BENIGN NEVI: Primary | ICD-10-CM

## 2025-04-23 DIAGNOSIS — L82.1 SK (SEBORRHEIC KERATOSIS): ICD-10-CM

## 2025-04-23 DIAGNOSIS — L81.4 LENTIGINES: ICD-10-CM

## 2025-04-23 DIAGNOSIS — D49.2 NEOPLASM OF SKIN: ICD-10-CM

## 2025-04-23 DIAGNOSIS — Z85.828 HISTORY OF NONMELANOMA SKIN CANCER: ICD-10-CM

## 2025-04-23 NOTE — NURSING NOTE
The following medication was given:     MEDICATION:  Lidocaine with epinephrine 1% 1:403008  ROUTE: SQ  SITE: see procedure note  DOSE: 1 mL  LOT #: [id number]  : [employer]  EXPIRATION DATE: 11-30-25  NDC#: 8285-3617-02  Was there drug waste? No  Multi-dose vial: Yes    Cookie Phipps  April 23, 2025

## 2025-04-23 NOTE — LETTER
4/23/2025      Uvaldo Ascencio  [address]  [place] [address]      Dear Colleague,    Thank you for referring your patient, Uvaldo Ascencio, to the [facility]. Please see a copy of my visit note below.    [facility] Dermatology Note  Encounter Date: Apr 23, 2025  Office Visit      Dermatology Problem List:  FBSE 4/23/25     #ROB upper arm, S/p Biopsy performed on 4/23/25, pending results  1. Hx of NMSC  - nBCC, R cheek,  s/p Mohs 4/21/25  - nBCC, L temple, s/p Mohs 4/21/25  - sBCC, L sharp, s/p bx 2/21/19, efudex cream x6 weeks.   2. Actinic keratoses, diffuse  - s/p efudex 5% cream BID for 3-4 weeks to scalp/face, initiated 2/21/19  - s/p cryotherapy  3. Benign biopsies  - BLK, L supraclavicular chest, s/p biopsy 2/21/19     Social history: Patient is retired from [employer]. Patient is an avid connolly and wood worker. Spends a lot of time outside with grandchildren. Tries to wear hat when outdoors.  ____________________________________________    Assessment & Plan:  # Actinic keratosis.   - R medial cheek x 1, this is the second time treating, if it would to return low threshold to biopsy.   - Cryotherapy performed today, see procedure note below.    # Neoplasm of unspecified behavior of the skin (D49.2) on the R upper arm. .   - Shave biopsy performed today, see procedure note below.   - Photographed today     # Hx of NMSC  - Sunscreen: Apply 20 minutes prior to going outdoors and reapply every two hours, when wet or sweating. We recommend using an SPF 30 or higher, and to use one that is water resistant.     - Advised to monitor for changing, non-healing, bleeding, painful, changing, or otherwise symptomatic lesions  - Continue annual skin exams    # Wound check s/p MMS to two sites - one on the R cheek and another on the L temple  - bandages changed today  - Photograph was obtained for clinical monitoring and inclusion in medical record.  - no evidence of infection,  continue good wound care    # Benign findings: multiple benign nevi, lentigines, cherry angiomas, SKs  - edu on benign etiology  - Signs and Symptoms of non-melanoma skin cancer and ABCDEs of melanoma reviewed with patient. Patient encouraged to perform monthly self skin exams and educated on how to perform them. UV precautions reviewed with patient. Patient was asked about new or changing moles/lesions on body.   - Sunscreen: Apply 20 minutes prior to going outdoors and reapply every two hours, when wet or sweating. We recommend using an SPF 30 or higher, and to use one that is water resistant.     - RTC for changes     Procedures Performed:   CRYOTHERAPY PROCEDURE NOTE: 1 lesions in the above locations were treated with liquid nitrogen utilizing a 5-10sec thaw time. Patient was advised that the treated areas will become red, swollen, may develop a blister and then should crust and peal off in the next 1-2 weeks. Post-procedure instructions were provided.    - Shave biopsy procedure note, location(s): see above . After discussion of benefits and risks including but not limited to bleeding, infection, scar, incomplete removal, recurrence, and non-diagnostic biopsy, written consent and photographs were obtained. The area was cleaned with isopropyl alcohol. 0.5mL of 1% lidocaine with epinephrine was injected to obtain adequate anesthesia of lesion(s). Shave biopsy at site(s) performed. Hemostasis was achieved with aluminium chloride. Petrolatum ointment and a sterile dressing were applied. The patient tolerated the procedure and no complications were noted. The patient was provided with verbal and written post care instructions.     Follow-up: 6 month(s) in-person, or earlier for new or changing lesions    Staff and scribe:     Scribe Disclosure:   BRANT MCMILLAN, am serving as a scribe; to document services personally performed by Emily Ordonez PA-C -based on data collection and the provider's statements to me.      Provider Disclosure:  I agree with above History, Review of Systems, Physical exam and Plan.  I have reviewed the content of the documentation and have edited it as needed. I have personally performed the services documented here and the documentation accurately represents those services and the decisions I have made.      Electronically signed by:    All risks, benefits and alternatives were discussed with patient.  Patient is in agreement and understands the assessment and plan.  All questions were answered.    Emily Ordonez PA-C, MPAS  [facility]: Phone: [phone], Fax: [phone]  [facility]: Phone: [phone],  Fax: [phone]  [facility]: Phone: [phone], Fax: [phone]  ____________________________________________    CC: Skin Check (Full body skin check. No spots of concern. Personal history of NMSC.)      Reviewed patients past medical history and pertinent chart review prior to patient's visit today.     HPI:  Mr. Uvaldo Ascencio is a 74 year old male who presents today as a return patient for FBSE. Seen by Dr. Rdz on 4/21/25 where Mohs was performed on his R cheek. Today patient did not report any spots of concern. Has a hx of NMSC. No family hx of skin cancer.     Patient is otherwise feeling well, without additional concerns.    Labs:  N/A    Physical Exam:  Vitals: There were no vitals taken for this visit.  SKIN: Total skin excluding the undergarment areas was performed. The exam included the head/face, neck, both arms, chest, back, abdomen, both legs, digits and/or nails.    -  Carlin's skin type II, has <100 nevi  There is/are erythematous macules with overlying adherent scale on the : x 1 L medial cheek  - There are dome shaped bright red papules on the trunk.   - Multiple regular brown pigmented macules and papules are identified on the trunk and extremities.    - Scattered brown macules on sun exposed areas.  - There are waxy stuck on tan to brown papules on the trunk.    -There are well healed surgical scars without erythema, nodularity or telangiectasias on the prior NMSC sites, NERD, also no evidence of infections at either site  - R arm there is a 5 mm dark brown macule with tan structure like areas and black angulated lines  - No other lesions of concern on areas examined.    Post op 4/23/25    Post op 4/23/25        Medications:  Current Outpatient Medications   Medication Sig Dispense Refill     ibuprofen (ADVIL/MOTRIN) 200 MG tablet Take 200 mg by mouth every 6 hours as needed.       Omeprazole Magnesium (PRILOSEC OTC PO) Take by mouth daily.       rosuvastatin (CRESTOR) 5 MG tablet Take 1 tablet (5 mg) by mouth daily. 90 tablet 1     No current facility-administered medications for this visit.      Past Medical/Surgical History:   Patient Active Problem List   Diagnosis     GERD (gastroesophageal reflux disease)     CARDIOVASCULAR SCREENING; LDL GOAL LESS THAN 160     Knee pain     NSAID long-term use     Left inguinal hernia     Arthritis of knee     Tobacco use disorder     No past medical history on file.                     Again, thank you for allowing me to participate in the care of your patient.        Sincerely,        Emily Ordonez PA-C    Electronically signed

## 2025-04-23 NOTE — NURSING NOTE
Uvaldo Ascencio's goals for this visit include:   Chief Complaint   Patient presents with    Skin Check     Full body skin check. No spots of concern. Personal history of NMSC.       He requests these members of his care team be copied on today's visit information:     PCP: Kathy Dickson    Referring Provider:  Referred Self, MD  No address on file    There were no vitals taken for this visit.    Do you need any medication refills at today's visit? Simona Mas [facility]

## 2025-04-23 NOTE — PROGRESS NOTES
[facility] Dermatology Note  Encounter Date: Apr 23, 2025  Office Visit      Dermatology Problem List:  FBSE 4/23/25     #ROB upper arm, S/p Biopsy performed on 4/23/25, pending results  1. Hx of NMSC  - nBCC, R cheek,  s/p Mohs 4/21/25  - nBCC, L temple, s/p Mohs 4/21/25  - sBCC, L sharp, s/p bx 2/21/19, efudex cream x6 weeks.   2. Actinic keratoses, diffuse  - s/p efudex 5% cream BID for 3-4 weeks to scalp/face, initiated 2/21/19  - s/p cryotherapy  3. Benign biopsies  - BLK, L supraclavicular chest, s/p biopsy 2/21/19     Social history: Patient is retired from [employer]. Patient is an avid connolly and wood worker. Spends a lot of time outside with grandchildren. Tries to wear hat when outdoors.  ____________________________________________    Assessment & Plan:  # Actinic keratosis.   - R medial cheek x 1, this is the second time treating, if it would to return low threshold to biopsy.   - Cryotherapy performed today, see procedure note below.    # Neoplasm of unspecified behavior of the skin (D49.2) on the R upper arm. .   - Shave biopsy performed today, see procedure note below.   - Photographed today     # Hx of NMSC  - Sunscreen: Apply 20 minutes prior to going outdoors and reapply every two hours, when wet or sweating. We recommend using an SPF 30 or higher, and to use one that is water resistant.     - Advised to monitor for changing, non-healing, bleeding, painful, changing, or otherwise symptomatic lesions  - Continue annual skin exams    # Wound check s/p MMS to two sites - one on the R cheek and another on the L temple  - bandages changed today  - Photograph was obtained for clinical monitoring and inclusion in medical record.  - no evidence of infection, continue good wound care    # Benign findings: multiple benign nevi, lentigines, cherry angiomas, SKs  - edu on benign etiology  - Signs and Symptoms of non-melanoma skin cancer and ABCDEs of melanoma reviewed with patient. Patient  encouraged to perform monthly self skin exams and educated on how to perform them. UV precautions reviewed with patient. Patient was asked about new or changing moles/lesions on body.   - Sunscreen: Apply 20 minutes prior to going outdoors and reapply every two hours, when wet or sweating. We recommend using an SPF 30 or higher, and to use one that is water resistant.     - RTC for changes     Procedures Performed:   CRYOTHERAPY PROCEDURE NOTE: 1 lesions in the above locations were treated with liquid nitrogen utilizing a 5-10sec thaw time. Patient was advised that the treated areas will become red, swollen, may develop a blister and then should crust and peal off in the next 1-2 weeks. Post-procedure instructions were provided.    - Shave biopsy procedure note, location(s): see above . After discussion of benefits and risks including but not limited to bleeding, infection, scar, incomplete removal, recurrence, and non-diagnostic biopsy, written consent and photographs were obtained. The area was cleaned with isopropyl alcohol. 0.5mL of 1% lidocaine with epinephrine was injected to obtain adequate anesthesia of lesion(s). Shave biopsy at site(s) performed. Hemostasis was achieved with aluminium chloride. Petrolatum ointment and a sterile dressing were applied. The patient tolerated the procedure and no complications were noted. The patient was provided with verbal and written post care instructions.     Follow-up: 6 month(s) in-person, or earlier for new or changing lesions    Staff and scribe:     Scribe Disclosure:   I, BRANT COLLINS, am serving as a scribe; to document services personally performed by Emily Ordonez PA-C -based on data collection and the provider's statements to me.     Provider Disclosure:  I agree with above History, Review of Systems, Physical exam and Plan.  I have reviewed the content of the documentation and have edited it as needed. I have personally performed the services documented  here and the documentation accurately represents those services and the decisions I have made.      Electronically signed by:    All risks, benefits and alternatives were discussed with patient.  Patient is in agreement and understands the assessment and plan.  All questions were answered.    Emily Ordonez PA-C, MPAS  [facility] [place]: Phone: [phone], Fax: [phone]  [facility]: Phone: [phone],  Fax: [phone]  [facility]: Phone: [phone], Fax: [phone]  ____________________________________________    CC: Skin Check (Full body skin check. No spots of concern. Personal history of NMSC.)      Reviewed patients past medical history and pertinent chart review prior to patient's visit today.     HPI:  Mr. Uvaldo Ascencio is a 74 year old male who presents today as a return patient for FBSE. Seen by Dr. Rdz on 4/21/25 where Mohs was performed on his R cheek. Today patient did not report any spots of concern. Has a hx of NMSC. No family hx of skin cancer.     Patient is otherwise feeling well, without additional concerns.    Labs:  N/A    Physical Exam:  Vitals: There were no vitals taken for this visit.  SKIN: Total skin excluding the undergarment areas was performed. The exam included the head/face, neck, both arms, chest, back, abdomen, both legs, digits and/or nails.    -  Carlin's skin type II, has <100 nevi  There is/are erythematous macules with overlying adherent scale on the : x 1 L medial cheek  - There are dome shaped bright red papules on the trunk.   - Multiple regular brown pigmented macules and papules are identified on the trunk and extremities.   - Scattered brown macules on sun exposed areas.  - There are waxy stuck on tan to brown papules on the trunk.    -There are well healed surgical scars without erythema, nodularity or telangiectasias on the prior NMSC sites,  NERD, also no evidence of infections at either site  - R arm there is a 5 mm dark brown macule with tan structure like areas and black angulated lines  - No other lesions of concern on areas examined.    Post op 4/23/25    Post op 4/23/25        Medications:  Current Outpatient Medications   Medication Sig Dispense Refill    ibuprofen (ADVIL/MOTRIN) 200 MG tablet Take 200 mg by mouth every 6 hours as needed.      Omeprazole Magnesium (PRILOSEC OTC PO) Take by mouth daily.      rosuvastatin (CRESTOR) 5 MG tablet Take 1 tablet (5 mg) by mouth daily. 90 tablet 1     No current facility-administered medications for this visit.      Past Medical/Surgical History:   Patient Active Problem List   Diagnosis    GERD (gastroesophageal reflux disease)    CARDIOVASCULAR SCREENING; LDL GOAL LESS THAN 160    Knee pain    NSAID long-term use    Left inguinal hernia    Arthritis of knee    Tobacco use disorder     No past medical history on file.

## 2025-04-23 NOTE — PATIENT INSTRUCTIONS
Cryotherapy    What is it?  Use of a very cold liquid, such as liquid nitrogen, to freeze and destroy abnormal skin cells that need to be removed    What should I expect?  Tenderness and redness  A small blister that might grow and fill with dark purple blood. There may be crusting.  More than one treatment may be needed if the lesions do not go away.    How do I care for the treated area?  Gently wash the area with your hands when bathing.  Use a thin layer of Vaseline to help with healing. You may use a Band-Aid.   The area should heal within 7-10 days and may leave behind a pink or lighter color.   Do not use an antibiotic or Neosporin ointment.   You may take acetaminophen (Tylenol) for pain.     Call your Doctor if you have:  Severe pain  Signs of infection (warmth, redness, cloudy yellow drainage, and or a bad smell)  Questions or concerns    Who should I call with questions?      [facility]: [phone]      [facility]: [phone]      For urgent needs outside of business hours call the [facility] at [phone]        and ask for the dermatology resident on call      Patient Education       Proper skin care from [facility]:    -Eliminate harsh soaps as they strip the natural oils from the skin, often resulting in dry itchy skin ( i.e. Dial, Zest, [place])  -Use mild soaps such as Cetaphil or Dove Sensitive Skin in the shower. You do not need to use soap on arms, legs, and trunk every time you shower unless visibly soiled.   -Avoid hot or cold showers.  -After showering, lightly dry off and apply moisturizing within 2-3 minutes. This will help trap moisture in the skin.   -Aggressive use of a moisturizer at least 1-2 times a day to the entire body (including -Vanicream, Cetaphil, Aquaphor or Cerave) and moisturize hands after every washing.  -We recommend using moisturizers that come in a tub that needs to be scooped  out, not a pump. This has more of an oil base. It will hold moisture in your skin much better than a water base moisturizer. The above recommended are non-pore clogging.      Wear a sunscreen with at least SPF 30 on your face, ears, neck and V of the chest daily. Wear sunscreen on other areas of the body if those areas are exposed to the sun throughout the day. Sunscreens can contain physical and/or chemical blockers. Physical blockers are less likely to clog pores, these include zinc oxide and titanium dioxide. Reapply every two hour and after swimming.     Sunscreen examples: https://www.ewg.org/sunscreen/    UV radiation  UVA radiation remains constant throughout the day and throughout the year. It is a longer wavelength than UVB and therefore penetrates deeper into the skin leading to immediate and delayed tanning, photoaging, and skin cancer. 70-80% of UVA and UVB radiation occurs between the hours of 10am-2pm.  UVB radiation  UVB radiation causes the most harmful effects and is more significant during the summer months. However, snow and ice can reflect UVB radiation leading to skin damage during the winter months as well. UVB radiation is responsible for tanning, burning, inflammation, delayed erythema (pinkness), pigmentation (brown spots), and skin cancer.     I recommend self monthly full body exams and yearly full body exams with a dermatology provider. If you develop a new or changing lesion please follow up for examination. Most skin cancers are pink and scaly or pink and pearly. However, we do see blue/brown/black skin cancers.  Consider the ABCDEs of melanoma when giving yourself your monthly full body exam ( don't forget the groin, buttocks, feet, toes, etc). A-asymmetry, B-borders, C-color, D-diameter, E-elevation or evolving. If you see any of these changes please follow up in clinic. If you cannot see your back I recommend purchasing a hand held mirror to use with a larger wall mirror.        Checking for Skin Cancer  You can find cancer early by checking your skin each month. There are 3 kinds of skin cancer. They are melanoma, basal cell carcinoma, and squamous cell carcinoma. Doing monthly skin checks is the best way to find new marks or skin changes. Follow the instructions below for checking your skin.   The ABCDEs of checking moles for melanoma   Check your moles or growths for signs of melanoma using ABCDE:   Asymmetry: the sides of the mole or growth don t match  Border: the edges are ragged, notched, or blurred  Color: the color within the mole or growth varies  Diameter: the mole or growth is larger than 6 mm (size of a pencil eraser)  Evolving: the size, shape, or color of the mole or growth is changing (evolving is not shown in the images below)    Checking for other types of skin cancer  Basal cell carcinoma or squamous cell carcinoma have symptoms such as:     A spot or mole that looks different from all other marks on your skin  Changes in how an area feels, such as itching, tenderness, or pain  Changes in the skin's surface, such as oozing, bleeding, or scaliness  A sore that does not heal  New swelling or redness beyond the border of a mole    Who s at risk?  Anyone can get skin cancer. But you are at greater risk if you have:   Fair skin, light-colored hair, or light-colored eyes  Many moles or abnormal moles on your skin  A history of sunburns from sunlight or tanning beds  A family history of skin cancer  A history of exposure to radiation or chemicals  A weakened immune system  If you have had skin cancer in the past, you are at risk for recurring skin cancer.   How to check your skin  Do your monthly skin checkups in front of a full-length mirror. Check all parts of your body, including your:   Head (ears, face, neck, and scalp)  Torso (front, back, and sides)  Arms (tops, undersides, upper, and lower armpits)  Hands (palms, backs, and fingers, including under the nails)  Buttocks  and genitals  Legs (front, back, and sides)  Feet (tops, soles, toes, including under the nails, and between toes)  If you have a lot of moles, take digital photos of them each month. Make sure to take photos both up close and from a distance. These can help you see if any moles change over time.   Most skin changes are not cancer. But if you see any changes in your skin, call your doctor right away. Only he or she can diagnose a problem. If you have skin cancer, seeing your doctor can be the first step toward getting the treatment that could save your life.   [employer] last reviewed this educational content on 4/1/2019 2000-2020 [employer] [address] [place] [address], [place] [address]. All rights reserved. This information is not intended as a substitute for professional medical care. Always follow your healthcare professional's instructions.

## 2025-04-29 LAB
PATH REPORT.COMMENTS IMP SPEC: ABNORMAL
PATH REPORT.COMMENTS IMP SPEC: YES
PATH REPORT.FINAL DX SPEC: ABNORMAL
PATH REPORT.GROSS SPEC: ABNORMAL
PATH REPORT.MICROSCOPIC SPEC OTHER STN: ABNORMAL
PATH REPORT.RELEVANT HX SPEC: ABNORMAL

## 2025-05-29 ENCOUNTER — OFFICE VISIT (OUTPATIENT)
Dept: DERMATOLOGY | Facility: CLINIC | Age: 75
End: 2025-05-29
Payer: MEDICARE

## 2025-05-29 DIAGNOSIS — D03.61 MELANOMA IN SITU OF RIGHT UPPER ARM (H): Primary | ICD-10-CM

## 2025-05-29 NOTE — PATIENT INSTRUCTIONS
Caring for your skin after surgery    For the first 48 hours after your surgery:  Leave the pressure dressing on and keep it dry. If it should come loose, you may re-tape it, but do not take it off.  Relax and take it easy.  Do not do any vigorous exercise, heavy lifting or bending forward. This could cause the wound to bleed.  If the wound is on your head, sleeping with your head elevated for the first few nights will help with swelling and bleeding. (Use linens/pillow cases that would be ok to get blood on in the event there is some oozing from the bandage).  Post-operative pain is usually mild.  You may alternate between 1000 mg of Tylenol (acetaminophen) and 400 mg of Ibuprofen every 4 hours.  This means, for example, that you could take the followin,000 mg of Tylenol, followed 4 hours later by 400 mg Ibuprofen, followed 4 hours later with 1,000 mg of Tylenol, and so forth.  Do not take more than 4,000 mg of acetaminophen in a 24-hour period or 3200 mg of Ibuprofen in a 24-hr period.    Avoid alcohol and vitamin E as these may increase your tendency to bleed.  Apply an ice pack for 20 min every 2-3 hours while awake.  This may help reduce swelling, bruising, and pain.  Make sure the ice pack is waterproof so that the pressure bandage doesn't get wet.  You may see a small amount of drainage or blood on your pressure bandage. This is normal. However:  If you have bleeding saturating the bandage, you will need to apply firm pressure over the bandage with a clean washcloth for 15 minutes. (Your Tegaderm is no longer intact and you will need to do daily wound care as listed below).  Remove the saturated bandage & Tegaderm.  If bleeding has stopped, apply Vaseline over the suture line and cover with a non-stick bandage. To add some pressure over the wound, fold a piece of gauze to tape over the area.  If bleeding continues after applying pressure for 15 minutes, apply an ice pack with gentle pressure to the  bandaged area for another 15 minutes.  If bleeding still continues, call our office or go to the nearest emergency room.    48 Hours After Surgery:  Remove outer white bandage down to clear plastic film (Tegaderm).  You may notice dark brown, dried blood under the Tegaderm.  This is normal.  Leave the clear plastic film (Tegaderm) on for 2 weeks, as long as it is intact.  If it falls off prior to 2 weeks follow daily wound care below.  If it stays intact for the full 2 weeks, then remove, cleanse skin and treat as normal, healthy skin.    Daily Wound Care (if Tegaderm is no longer intact):  Remove bandage  Wash wound with a mild soap and water.  Use caution when washing the wound, be gentle and do not let the forceful shower stream hit the wound directly.   Pat dry.  Apply Vaseline or Aquaphor ointment (from a new container or tube) over the suture line with a Q-tip.  Cover the site with a bandage.  Do this daily until you reach the 2 week kristy.    What to expect:  The first couple of days your wound may be tender.  There may be swelling and bruising around the wound, especially if it is near the eyes. For your comfort, you may apply ice or cold compresses to the area.  The area around your wound may be numb for several weeks or even months.  You may experience periodic sharp pain or mild itching around the wound as it heals.    Call Us If:  You have bleeding that will not stop after applying pressure and ice.  You have pain that is not controlled with Tylenol and Ibuprofen.  You have signs or symptoms of an infection such as fever over 100 degrees Fahrenheit, redness, swelling, or warmth spreading from the wound, increasing pain after the first 48 hours, or white/yellow/green drainage from the wound (may or may not have a foul odor).    [facility], Dermatology [phone].  Available - 8.  For urgent needs outside of business hours, call the [facility] at [phone] and  ask to speak with/page the dermatology resident on call.

## 2025-05-29 NOTE — NURSING NOTE
Uvaldo Ascencio's goals for this visit include:   Chief Complaint   Patient presents with    Procedure     MIS right upper arm       He requests these members of his care team be copied on today's visit information:     PCP: Kathy Dickson    Referring Provider:  Emily Ordonez PA-C  [id number] [place] [address]    There were no vitals taken for this visit.    Do you need any medication refills at today's visit?         Karen Pink EMT    The following medication was given:     MEDICATION:  Lidocaine with epinephrine 1% [id number]  ROUTE: SQ  SITE: see procedure note  DOSE: 9ml  LOT #: [id number]  : [employer]  EXPIRATION DATE: 12/31/2025  NDC#: 8452-9024-30  Was there drug waste? No  Multi-dose vial: Yes    Karen Pink  May 29, 2025       Mastisol, Steri-Strips, Tegaderm and pressure dressing applied to excision site on R Upper arm.  Wound care instructions reviewed with patient and AVS provided.  Patient verbalized understanding.  Post op appointment scheduled: N/A. Patient will follow up for suture removal: N/A  No further questions or concerns at this time.

## 2025-05-29 NOTE — PROGRESS NOTES
DERMATOLOGY EXCISION PROCEDURE NOTE    Dermatology Problem List:  FBSE 4/23/25     1. Hx of NMSC  - nBCC, R cheek,  s/p Mohs 4/21/25  - nBCC, L temple, s/p Mohs 4/21/25  - sBCC, L sharp, s/p bx 2/21/19, efudex cream x6 weeks.   2. Actinic keratoses, diffuse  - s/p efudex 5% cream BID for 3-4 weeks to scalp/face, initiated 2/21/19  - s/p cryotherapy  3. Benign biopsies  - BLK, L supraclavicular chest, s/p biopsy 2/21/19  4. MIS arising within a precursor nevus, right upper arm, s/p bx 4/23/25, s/p excision 5/29/25     Social history: Patient is retired from [employer]. Patient is an avid connolly and wood worker. Spends a lot of time outside with grandchildren. Tries to wear hat when outdoors.  __________________________________________________________________    NAME OF PROCEDURE: Excision with intermediate linear closure  Staff surgeon: Dr. Noe Rdz  Fellow: Dr.Igor Solo  Resident: Dr. Coronado Jeffery  Scrub Nurse: Neil Pink    PRE-OPERATIVE DIAGNOSIS:  Melanoma in situ  POST-OPERATIVE DIAGNOSIS: Same   LOCATION: right upper arm  FINAL EXCISION SIZE(DEFECT SIZE): 1.9 x 1.7 cm  MARGIN: 0.5 cm  FINAL REPAIR LENGTH: 3.7 cm   ANESTHESIA: 9ml 1% lidocaine with 1:100,000 epinephrine    INDICATIONS: This patient presented with a 0.9 x 0.7 cm Melanoma in situ. Excision was indicated. We discussed the principles of treatment and most likely complications including scarring, bleeding, infection, incomplete excision, wound dehiscence, pain, nerve damage, and recurrence. Informed consent was obtained and the patient underwent the procedure as follows:    PROCEDURE: The patient was taken to the operative suite. Time-out was performed. The treatment area was anesthetized with 1% lidocaine with epinephrine. The area was prepped with Chlorhexidine and rinsed with sterile saline and draped with sterile towels. The lesion was delineated and excised down to subcutaneous fat in a elliptical manner. Hemostasis was obtained by  electrocoagulation.     REPAIR: An intermediate layered linear closure was selected as the procedure which would maximally preserve both function and cosmesis.    After the excision of the tumor, the area was carefully undermined. Hemostasis was obtained with bipolar electrocoagulation. Closure was oriented so that the wound was in the patient's natural skin tension lines. The deeper layers of subcutaneous and superficial (nonmuscle) fascia tissues were then approximated using 4-0 Monocryl buried verticle mattress sutures (deep layer suturing). The wound edges were then approximated; additional buried sutures were placed in a similar fashion where needed. 4-0 Monocryl sutures (superficial layer suturing) were carefully placed for maximum eversion and meticulous approximation.    Estimated blood loss was less than 10 ml for all surgical sites. A sterile pressure dressing was applied and wound care instructions, with a written handout, were given. The [facility] was [facility] alert and ambulatory.    The patient elected for pathology results to automatically release and understands that the clinical staff will contact them as soon as possible to notify them of the results.    Follow-up: PRN    Dr. Noe Rdz MD was physically present  for the entire procedure and always immediately available.     Anatomic Pathology Results: pending    Clinical Follow-Up: N/A sutures are all absorbable    Staff Involved:   Scribe/Fellow/Staff     Scribe Disclosure:   I, JORGE MARIE, am serving as a scribe; to document services personally performed by Noe Rdz MD -based on data collection and the provider's statements to me.    Tommy Solo MD    Staff Physician Comments:   I saw and evaluated the patient with the Mohs Surgery Fellow (Dr. Tommy Solo) and the dermatology resident (Dr. Cliff Gil) and I agree with the assessment and plan and the above description of the procedure  as documented by the scribe. I was present for the entire procedure and entire exam.     Noe Rdz DO    Department of Dermatology  [facility]: Phone: [phone], Fax:[phone]  [facility]: Phone: [phone], Fax: [phone]

## 2025-05-29 NOTE — LETTER
5/29/2025      Uvaldo Ascencio  [address]  [place] [address]      Dear Colleague,    Thank you for referring your patient, Uvaldo Ascencio, to the [facility]. Please see a copy of my visit note below.    DERMATOLOGY EXCISION PROCEDURE NOTE    Dermatology Problem List:  FBSE 4/23/25     1. Hx of NMSC  - nBCC, R cheek,  s/p Mohs 4/21/25  - nBCC, L temple, s/p Mohs 4/21/25  - sBCC, L sharp, s/p bx 2/21/19, efudex cream x6 weeks.   2. Actinic keratoses, diffuse  - s/p efudex 5% cream BID for 3-4 weeks to scalp/face, initiated 2/21/19  - s/p cryotherapy  3. Benign biopsies  - BLK, L supraclavicular chest, s/p biopsy 2/21/19  4. MIS arising within a precursor nevus, right upper arm, s/p bx 4/23/25, s/p excision 5/29/25     Social history: Patient is retired from [employer]. Patient is an avid connolly and wood worker. Spends a lot of time outside with grandchildren. Tries to wear hat when outdoors.  __________________________________________________________________    NAME OF PROCEDURE: Excision with intermediate linear closure  Staff surgeon: Dr. Neo Rdz  Fellow: Dr.Igor Solo  Resident: Dr. Coronado Jeffery  Scrub Nurse: Neil Pink    PRE-OPERATIVE DIAGNOSIS:  Melanoma in situ  POST-OPERATIVE DIAGNOSIS: Same   LOCATION: right upper arm  FINAL EXCISION SIZE(DEFECT SIZE): 1.9 x 1.7 cm  MARGIN: 0.5 cm  FINAL REPAIR LENGTH: 3.7 cm   ANESTHESIA: 9ml 1% lidocaine with 1:100,000 epinephrine    INDICATIONS: This patient presented with a 0.9 x 0.7 cm Melanoma in situ. Excision was indicated. We discussed the principles of treatment and most likely complications including scarring, bleeding, infection, incomplete excision, wound dehiscence, pain, nerve damage, and recurrence. Informed consent was obtained and the patient underwent the procedure as follows:    PROCEDURE: The patient was taken to the operative suite. Time-out was performed. The treatment area was anesthetized with 1% lidocaine with  epinephrine. The area was prepped with Chlorhexidine and rinsed with sterile saline and draped with sterile towels. The lesion was delineated and excised down to subcutaneous fat in a elliptical manner. Hemostasis was obtained by electrocoagulation.     REPAIR: An intermediate layered linear closure was selected as the procedure which would maximally preserve both function and cosmesis.    After the excision of the tumor, the area was carefully undermined. Hemostasis was obtained with bipolar electrocoagulation. Closure was oriented so that the wound was in the patient's natural skin tension lines. The deeper layers of subcutaneous and superficial (nonmuscle) fascia tissues were then approximated using 4-0 Monocryl buried verticle mattress sutures (deep layer suturing). The wound edges were then approximated; additional buried sutures were placed in a similar fashion where needed. 4-0 Monocryl sutures (superficial layer suturing) were carefully placed for maximum eversion and meticulous approximation.    Estimated blood loss was less than 10 ml for all surgical sites. A sterile pressure dressing was applied and wound care instructions, with a written handout, were given. The patient was discharged from the [facility] alert and ambulatory.    The patient elected for pathology results to automatically release and understands that the clinical staff will contact them as soon as possible to notify them of the results.    Follow-up: PRN    Dr. Noe Rdz MD was physically present  for the entire procedure and always immediately available.     Anatomic Pathology Results: pending    Clinical Follow-Up: N/A sutures are all absorbable    Staff Involved:   Scribe/Fellow/Staff     Scribe Disclosure:   IJORGE, am serving as a scribe; to document services personally performed by Noe Rdz MD -based on data collection and the provider's statements to me.    Tommy Solo MD    Staff  Physician Comments:   I saw and evaluated the patient with the Mohs Surgery Fellow (Dr. Tommy Solo) and the dermatology resident (Dr. Cliff Gil) and I agree with the assessment and plan and the above description of the procedure as documented by the scribe. I was present for the entire procedure and entire exam.     Noe Rdz DO    Department of Dermatology  Formerly named [facility]: Phone: [phone], Fax:[phone]  [facility] [place]: Phone: [phone], Fax: [phone]        Again, thank you for allowing me to participate in the care of your patient.        Sincerely,        Noe Rdz MD    Electronically signed

## 2025-06-08 LAB
PATH REPORT.COMMENTS IMP SPEC: NORMAL
PATH REPORT.FINAL DX SPEC: NORMAL
PATH REPORT.GROSS SPEC: NORMAL
PATH REPORT.MICROSCOPIC SPEC OTHER STN: NORMAL
PATH REPORT.RELEVANT HX SPEC: NORMAL

## 2025-06-09 ENCOUNTER — RESULTS FOLLOW-UP (OUTPATIENT)
Dept: DERMATOLOGY | Facility: CLINIC | Age: 75
End: 2025-06-09
Payer: MEDICARE